# Patient Record
Sex: MALE | URBAN - METROPOLITAN AREA
[De-identification: names, ages, dates, MRNs, and addresses within clinical notes are randomized per-mention and may not be internally consistent; named-entity substitution may affect disease eponyms.]

---

## 2017-06-24 ENCOUNTER — INPATIENT HOSPITAL (OUTPATIENT)
Dept: URBAN - METROPOLITAN AREA CLINIC 129 | Facility: CLINIC | Age: 37
Setting detail: OPHTHALMOLOGY
End: 2017-06-24
Payer: COMMERCIAL

## 2017-06-24 DIAGNOSIS — H46.8: ICD-10-CM

## 2017-06-24 PROCEDURE — 99223 1ST HOSP IP/OBS HIGH 75: CPT | Performed by: OPHTHALMOLOGY

## 2021-01-05 ENCOUNTER — OFFICE VISIT (OUTPATIENT)
Dept: FAMILY MEDICINE CLINIC | Facility: CLINIC | Age: 41
End: 2021-01-05
Payer: COMMERCIAL

## 2021-01-05 VITALS
BODY MASS INDEX: 24.4 KG/M2 | DIASTOLIC BLOOD PRESSURE: 88 MMHG | TEMPERATURE: 97.2 F | SYSTOLIC BLOOD PRESSURE: 140 MMHG | HEIGHT: 68 IN | WEIGHT: 161 LBS

## 2021-01-05 DIAGNOSIS — M48.02 CERVICAL STENOSIS OF SPINE: ICD-10-CM

## 2021-01-05 DIAGNOSIS — E55.9 VITAMIN D DEFICIENCY: ICD-10-CM

## 2021-01-05 DIAGNOSIS — G35 MULTIPLE SCLEROSIS (HCC): Primary | ICD-10-CM

## 2021-01-05 DIAGNOSIS — I10 HYPERTENSION, BENIGN ESSENTIAL, GOAL BELOW 140/90: ICD-10-CM

## 2021-01-05 DIAGNOSIS — Z72.0 TOBACCO ABUSE: ICD-10-CM

## 2021-01-05 DIAGNOSIS — K21.9 GASTROESOPHAGEAL REFLUX DISEASE WITHOUT ESOPHAGITIS: ICD-10-CM

## 2021-01-05 DIAGNOSIS — Z22.7 LTBI (LATENT TUBERCULOSIS INFECTION): ICD-10-CM

## 2021-01-05 DIAGNOSIS — H53.8 BLURRED VISION, RIGHT EYE: ICD-10-CM

## 2021-01-05 DIAGNOSIS — F32.1 CURRENT MODERATE EPISODE OF MAJOR DEPRESSIVE DISORDER WITHOUT PRIOR EPISODE (HCC): ICD-10-CM

## 2021-01-05 PROBLEM — G57.51 TARSAL TUNNEL SYNDROME OF RIGHT SIDE: Status: ACTIVE | Noted: 2020-09-13

## 2021-01-05 PROBLEM — R74.01 TRANSAMINITIS: Status: ACTIVE | Noted: 2020-12-01

## 2021-01-05 PROBLEM — G89.29 CHRONIC LOW BACK PAIN: Status: ACTIVE | Noted: 2020-09-14

## 2021-01-05 PROBLEM — M54.50 CHRONIC LOW BACK PAIN: Status: ACTIVE | Noted: 2020-09-14

## 2021-01-05 PROBLEM — R74.01 TRANSAMINITIS: Status: RESOLVED | Noted: 2020-12-01 | Resolved: 2021-01-05

## 2021-01-05 PROCEDURE — 3725F SCREEN DEPRESSION PERFORMED: CPT | Performed by: FAMILY MEDICINE

## 2021-01-05 PROCEDURE — 3079F DIAST BP 80-89 MM HG: CPT | Performed by: FAMILY MEDICINE

## 2021-01-05 PROCEDURE — 99204 OFFICE O/P NEW MOD 45 MIN: CPT | Performed by: FAMILY MEDICINE

## 2021-01-05 PROCEDURE — 4004F PT TOBACCO SCREEN RCVD TLK: CPT | Performed by: FAMILY MEDICINE

## 2021-01-05 PROCEDURE — 3008F BODY MASS INDEX DOCD: CPT | Performed by: FAMILY MEDICINE

## 2021-01-05 PROCEDURE — 3077F SYST BP >= 140 MM HG: CPT | Performed by: FAMILY MEDICINE

## 2021-01-05 RX ORDER — IBUPROFEN 200 MG
1 CAPSULE ORAL DAILY
COMMUNITY
Start: 2020-08-17

## 2021-01-05 RX ORDER — ISONIAZID 300 MG/1
900 TABLET ORAL WEEKLY
COMMUNITY
Start: 2020-12-01 | End: 2021-04-05

## 2021-01-05 RX ORDER — FERROUS SULFATE 325(65) MG
324 TABLET ORAL
COMMUNITY
Start: 2020-08-17

## 2021-01-05 RX ORDER — DIPHENHYDRAMINE HYDROCHLORIDE 25 MG/1
25 CAPSULE ORAL DAILY
COMMUNITY
Start: 2020-12-01 | End: 2021-01-05

## 2021-01-05 RX ORDER — GLATIRAMER 40 MG/ML
INJECTION, SOLUTION SUBCUTANEOUS
COMMUNITY
Start: 2020-08-14 | End: 2021-07-08

## 2021-01-05 RX ORDER — ESCITALOPRAM OXALATE 10 MG/1
10 TABLET ORAL
COMMUNITY
Start: 2020-08-17 | End: 2021-01-06 | Stop reason: SDUPTHER

## 2021-01-05 RX ORDER — DIPHENHYDRAMINE HYDROCHLORIDE 25 MG/1
25 CAPSULE ORAL DAILY
COMMUNITY
Start: 2020-12-01

## 2021-01-05 RX ORDER — PANTOPRAZOLE SODIUM 40 MG/1
40 TABLET, DELAYED RELEASE ORAL DAILY
COMMUNITY
Start: 2020-12-16 | End: 2021-01-19 | Stop reason: SDUPTHER

## 2021-01-05 RX ORDER — GABAPENTIN 300 MG/1
300 CAPSULE ORAL 3 TIMES DAILY
COMMUNITY
Start: 2020-12-01 | End: 2021-04-05

## 2021-01-05 RX ORDER — CALCIUM POLYCARBOPHIL 625 MG 625 MG/1
625 TABLET ORAL
COMMUNITY

## 2021-01-05 RX ORDER — AMLODIPINE BESYLATE 5 MG/1
5 TABLET ORAL DAILY
COMMUNITY
Start: 2020-12-09 | End: 2021-01-06 | Stop reason: SDUPTHER

## 2021-01-05 NOTE — PROGRESS NOTES
Assessment/Plan:    Blurred vision, right eye  followup with eye doctor and neurology    Cervical stenosis of spine  Keep appointment for steroid injection continue neurontin    Current moderate episode of major depressive disorder without prior episode [facility])  Patient is now feeling much better Divorce is settled Patient has moved and has a new job Stable on meds conitnue current meds and followup in 6 months    GERD (gastroesophageal reflux disease)  Continue with protonix    Hypertension, benign essential, goal below 140/90  Blood pressure stable on meds conitnue meds and followup in 6 motnhs    LTBI (latent tuberculosis infection)  conitnue meds and followup with infectious disease    [facility] ([facility]  Reviewed MRI from the hospital Patient will continue to see the neurologist as scheduled and continue current med    Tobacco abuse  Encouraged stopping smoking Patient to discuss with the neurologist if chantix is an option If so and patient is ready I will start that     Vitamin D deficiency  Continue iwht vitamin D Rpeat labs at next visit in 6 months       Diagnoses and all orders for this visit:    Multiple sclerosis ([facility] )  -     Ambulatory referral to Ophthalmology; Future    Blurred vision, right eye  -     Ambulatory referral to Ophthalmology; Future    Hypertension, benign essential, goal below 140/90    Current moderate episode of major depressive disorder without prior episode (HCC)    Cervical stenosis of spine    LTBI (latent tuberculosis infection)    Tobacco abuse    Gastroesophageal reflux disease without esophagitis    Vitamin D deficiency    Other orders  -     amLODIPine (NORVASC) 5 mg tablet; Take 5 mg by mouth daily  -     escitalopram (LEXAPRO) 10 mg tablet; Take 10 mg by mouth  -     gabapentin (NEURONTIN) 300 mg capsule; Take 300 mg by mouth Three times a day  -     pantoprazole (PROTONIX) 40 mg tablet; Take 40 mg by mouth daily  -     Pyridoxine HCl (vitamin B-6) 25 MG tablet;  Take 25 mg by mouth daily  -     Glatiramer Acetate 40 MG/ML SOSY; Inject 1 mL (40 mg total) under the skin 3 (three) times a week (M,W, F) at 1800   -     isoniazid (NYDRAZID) 300 mg tablet; Take 900 mg by mouth once a week  -     Discontinue: Pyridoxine HCl (vitamin B-6) 25 MG tablet; Take 25 mg by mouth daily  -     calcium polycarbophil (FiberCon) 625 mg tablet; Take 625 mg by mouth  -     MULTIPLE VITAMIN PO; Take 1 capsule by mouth  -     ferrous sulfate 325 (65 Fe) mg tablet; Take 324 mg by mouth  -     calcium carbonate (OS-ZHENG) 1250 (500 Ca) MG tablet; Take 1 tablet by mouth daily  -     Cholecalciferol 25 MCG (1000 UT) capsule; Take 1,000 Units by mouth daily  -     cyanocobalamin (VITAMIN B-12) 100 MCG tablet; Take 100 mcg by mouth          Subjective:   Chief Complaint   Patient presents with    Follow-up    Hypertension    Establish Care          Patient ID: Norris Ferraro is a 38 y o  male      Patient is here to establish care and check up of hypertension GERD depression multiple sclerosis history of anemia and latent TB infection Patient was diagnosed with MS in 2017 patient is seen by [facility] neurology Patient is on copaxone Patient is hoping to change to an infusion However during that process his quanteferon gold test was positive He was referred for treatment Unfortunately he had had hemorrhoid surgery prior That surgery plus meds caused a lot of stomach issues and led to lower GI bleed and anemia Patient had a hospitalization end of November due to neck pain and blurry vision right eye and right arm pain  It was never determined if the right eye was trule optic neuritis , due to MS exacerbation or an issue with borderline glaucoma Patient has appt  2/8/2021 with eye doctor Patient has his neurlogy appt coming up shortly Patient eye issues are unchanged It still feels blurry on right side and he has "pressure sensation behind the eye" Patient has a call into neurology about this Patient is tolerating the TB treatment at this time and has appt [facility] infectious disease  Patient BP is controlled He feels the depression is stable on lexapro  Patient had lipid panel done in 9/2020 that was favorable He has moved here from [facility] and is starting a new job He has several herniated discs in the neck and is to have injections Patient is tolerating all medication He is smoker He is allergic to nicoderm products He gets stomach upset and profound sweating       The following portions of the patient's history were reviewed and updated as appropriate: allergies, current medications, past family history, past medical history, past social history, past surgical history and problem list     Review of Systems   Constitutional: Positive for fatigue  Negative for fever and unexpected weight change  HENT: Negative for congestion, sinus pain and trouble swallowing  Eyes: Positive for pain and visual disturbance  Negative for discharge  Respiratory: Negative for cough, chest tightness, shortness of breath and wheezing  Cardiovascular: Negative for chest pain, palpitations and leg swelling  Gastrointestinal: Negative for abdominal pain, blood in stool, constipation, diarrhea, nausea and vomiting  Genitourinary: Negative for difficulty urinating, dysuria, frequency and hematuria  Musculoskeletal: Negative for arthralgias, gait problem and joint swelling  Skin: Negative for rash and wound  Allergic/Immunologic: Negative for environmental allergies and food allergies  Neurological: Negative for dizziness, syncope, weakness, numbness and headaches  Hematological: Negative for adenopathy  Does not bruise/bleed easily  Psychiatric/Behavioral: Negative for confusion, decreased concentration and sleep disturbance  The patient is not nervous/anxious            Objective:      /88   Temp (!) 97 2 °F (36 2 °C)   Ht 5' 8" (1 727 m)   Wt 73 kg (161 lb)   BMI 24 48 kg/m²          Physical Exam  Vitals signs and nursing note reviewed  Constitutional:       Appearance: He is well-developed  HENT:      Head: Normocephalic and atraumatic  Right Ear: Hearing, tympanic membrane and external ear normal       Left Ear: Hearing, tympanic membrane and external ear normal    Eyes:      Conjunctiva/sclera: Conjunctivae normal       Pupils: Pupils are equal, round, and reactive to light  Neck:      Musculoskeletal: Neck supple  Thyroid: No thyromegaly  Cardiovascular:      Rate and Rhythm: Normal rate  Heart sounds: Normal heart sounds  Pulmonary:      Effort: Pulmonary effort is normal       Breath sounds: Normal breath sounds  No wheezing or rales  Abdominal:      General: Bowel sounds are normal  There is no distension  Palpations: Abdomen is soft  Tenderness: There is no abdominal tenderness  Musculoskeletal:         General: No tenderness  Lymphadenopathy:      Cervical: No cervical adenopathy  Skin:     General: Skin is warm and dry  Findings: No rash  Neurological:      Mental Status: He is alert and oriented to person, place, and time  Cranial Nerves: No cranial nerve deficit  Coordination: Coordination normal    Psychiatric:         Behavior: Behavior normal          Thought Content:  Thought content normal          Judgment: Judgment normal

## 2021-01-05 NOTE — ASSESSMENT & PLAN NOTE
Encouraged stopping smoking Patient to discuss with the neurologist if chantix is an option If so and patient is ready I will start that

## 2021-01-05 NOTE — PATIENT INSTRUCTIONS

## 2021-01-05 NOTE — ASSESSMENT & PLAN NOTE
Patient is now feeling much better Divorce is settled Patient has moved and has a new job Stable on meds alona current meds and followup in 6 months

## 2021-01-05 NOTE — ASSESSMENT & PLAN NOTE
Reviewed MRI from the hospital Patient will continue to see the neurologist as scheduled and continue current med

## 2021-01-06 DIAGNOSIS — I10 HYPERTENSION, BENIGN ESSENTIAL, GOAL BELOW 140/90: Primary | ICD-10-CM

## 2021-01-06 DIAGNOSIS — F32.1 CURRENT MODERATE EPISODE OF MAJOR DEPRESSIVE DISORDER WITHOUT PRIOR EPISODE (HCC): ICD-10-CM

## 2021-01-06 RX ORDER — ESCITALOPRAM OXALATE 10 MG/1
10 TABLET ORAL DAILY
Qty: 30 TABLET | Refills: 3 | Status: SHIPPED | OUTPATIENT
Start: 2021-01-06 | End: 2021-03-31 | Stop reason: SDUPTHER

## 2021-01-06 RX ORDER — AMLODIPINE BESYLATE 5 MG/1
5 TABLET ORAL DAILY
Qty: 30 TABLET | Refills: 3 | Status: SHIPPED | OUTPATIENT
Start: 2021-01-06 | End: 2021-01-19 | Stop reason: SDUPTHER

## 2021-01-11 DIAGNOSIS — G89.29 CHRONIC PAIN OF RIGHT ANKLE: Primary | ICD-10-CM

## 2021-01-11 DIAGNOSIS — M25.571 CHRONIC PAIN OF RIGHT ANKLE: Primary | ICD-10-CM

## 2021-01-11 DIAGNOSIS — G57.51 TARSAL TUNNEL SYNDROME OF RIGHT SIDE: ICD-10-CM

## 2021-01-13 ENCOUNTER — TRANSCRIBE ORDERS (OUTPATIENT)
Dept: ADMINISTRATIVE | Facility: HOSPITAL | Age: 41
End: 2021-01-13

## 2021-01-13 DIAGNOSIS — M86.671 OTHER CHRONIC OSTEOMYELITIS, RIGHT ANKLE AND FOOT (HCC): Primary | ICD-10-CM

## 2021-01-19 DIAGNOSIS — Z72.0 TOBACCO ABUSE: Primary | ICD-10-CM

## 2021-01-19 DIAGNOSIS — K21.9 GASTROESOPHAGEAL REFLUX DISEASE WITHOUT ESOPHAGITIS: Primary | ICD-10-CM

## 2021-01-19 DIAGNOSIS — I10 HYPERTENSION, BENIGN ESSENTIAL, GOAL BELOW 140/90: ICD-10-CM

## 2021-01-19 RX ORDER — AMLODIPINE BESYLATE 5 MG/1
5 TABLET ORAL DAILY
Qty: 30 TABLET | Refills: 0 | Status: SHIPPED | OUTPATIENT
Start: 2021-01-19 | End: 2021-03-31 | Stop reason: SDUPTHER

## 2021-01-19 RX ORDER — PANTOPRAZOLE SODIUM 40 MG/1
40 TABLET, DELAYED RELEASE ORAL DAILY
Qty: 30 TABLET | Refills: 3 | Status: SHIPPED | OUTPATIENT
Start: 2021-01-19 | End: 2021-03-31 | Stop reason: SDUPTHER

## 2021-01-19 RX ORDER — VARENICLINE TARTRATE 25 MG
KIT ORAL
Qty: 53 TABLET | Refills: 0 | Status: SHIPPED | OUTPATIENT
Start: 2021-01-19 | End: 2021-03-31 | Stop reason: SDUPTHER

## 2021-01-21 ENCOUNTER — HOSPITAL ENCOUNTER (OUTPATIENT)
Dept: MRI IMAGING | Facility: HOSPITAL | Age: 41
Discharge: HOME | End: 2021-01-21
Attending: PODIATRIST
Payer: COMMERCIAL

## 2021-01-21 DIAGNOSIS — M86.671 OTHER CHRONIC OSTEOMYELITIS, RIGHT ANKLE AND FOOT (HCC): ICD-10-CM

## 2021-01-21 PROCEDURE — 73721 MRI JNT OF LWR EXTRE W/O DYE: CPT

## 2021-01-21 PROCEDURE — G1004 CDSM NDSC: HCPCS

## 2021-02-08 DIAGNOSIS — N52.1 ERECTILE DISORDER DUE TO MEDICAL CONDITION IN MALE: Primary | ICD-10-CM

## 2021-02-08 RX ORDER — SILDENAFIL 50 MG/1
50 TABLET, FILM COATED ORAL DAILY PRN
Qty: 10 TABLET | Refills: 0 | Status: SHIPPED | OUTPATIENT
Start: 2021-02-08 | End: 2021-03-31 | Stop reason: SDUPTHER

## 2021-03-31 DIAGNOSIS — I10 HYPERTENSION, BENIGN ESSENTIAL, GOAL BELOW 140/90: ICD-10-CM

## 2021-03-31 DIAGNOSIS — K21.9 GASTROESOPHAGEAL REFLUX DISEASE WITHOUT ESOPHAGITIS: ICD-10-CM

## 2021-03-31 DIAGNOSIS — F32.1 CURRENT MODERATE EPISODE OF MAJOR DEPRESSIVE DISORDER WITHOUT PRIOR EPISODE (HCC): ICD-10-CM

## 2021-03-31 DIAGNOSIS — N52.1 ERECTILE DISORDER DUE TO MEDICAL CONDITION IN MALE: ICD-10-CM

## 2021-03-31 DIAGNOSIS — Z72.0 TOBACCO ABUSE: ICD-10-CM

## 2021-03-31 RX ORDER — SILDENAFIL 50 MG/1
50 TABLET, FILM COATED ORAL DAILY PRN
Qty: 10 TABLET | Refills: 0 | Status: SHIPPED | OUTPATIENT
Start: 2021-03-31 | End: 2021-05-19 | Stop reason: SDUPTHER

## 2021-03-31 RX ORDER — PANTOPRAZOLE SODIUM 40 MG/1
40 TABLET, DELAYED RELEASE ORAL DAILY
Qty: 30 TABLET | Refills: 3 | Status: SHIPPED | OUTPATIENT
Start: 2021-03-31 | End: 2021-07-29 | Stop reason: SDUPTHER

## 2021-03-31 RX ORDER — ESCITALOPRAM OXALATE 10 MG/1
10 TABLET ORAL DAILY
Qty: 30 TABLET | Refills: 3 | Status: SHIPPED | OUTPATIENT
Start: 2021-03-31 | End: 2021-03-31 | Stop reason: SDUPTHER

## 2021-03-31 RX ORDER — VARENICLINE TARTRATE 25 MG
KIT ORAL
Qty: 53 TABLET | Refills: 0 | Status: SHIPPED | OUTPATIENT
Start: 2021-03-31 | End: 2021-07-08

## 2021-03-31 RX ORDER — AMLODIPINE BESYLATE 5 MG/1
5 TABLET ORAL DAILY
Qty: 30 TABLET | Refills: 3 | Status: SHIPPED | OUTPATIENT
Start: 2021-03-31 | End: 2021-04-05

## 2021-04-01 RX ORDER — ESCITALOPRAM OXALATE 10 MG/1
10 TABLET ORAL DAILY
Qty: 30 TABLET | Refills: 0 | Status: SHIPPED | OUTPATIENT
Start: 2021-04-01 | End: 2021-05-19 | Stop reason: SDUPTHER

## 2021-04-05 ENCOUNTER — OFFICE VISIT (OUTPATIENT)
Dept: FAMILY MEDICINE CLINIC | Facility: CLINIC | Age: 41
End: 2021-04-05
Payer: COMMERCIAL

## 2021-04-05 VITALS
HEIGHT: 68 IN | DIASTOLIC BLOOD PRESSURE: 92 MMHG | WEIGHT: 161.8 LBS | TEMPERATURE: 97.2 F | BODY MASS INDEX: 24.52 KG/M2 | SYSTOLIC BLOOD PRESSURE: 160 MMHG

## 2021-04-05 DIAGNOSIS — M47.816 FACET ARTHRITIS OF LUMBAR REGION: ICD-10-CM

## 2021-04-05 DIAGNOSIS — G35 MULTIPLE SCLEROSIS (HCC): ICD-10-CM

## 2021-04-05 DIAGNOSIS — F32.1 CURRENT MODERATE EPISODE OF MAJOR DEPRESSIVE DISORDER WITHOUT PRIOR EPISODE (HCC): ICD-10-CM

## 2021-04-05 DIAGNOSIS — I10 HYPERTENSION, BENIGN ESSENTIAL, GOAL BELOW 140/90: ICD-10-CM

## 2021-04-05 DIAGNOSIS — L30.9 DERMATITIS: ICD-10-CM

## 2021-04-05 DIAGNOSIS — M54.16 RIGHT LUMBAR RADICULOPATHY: Primary | ICD-10-CM

## 2021-04-05 PROBLEM — Z22.7 LTBI (LATENT TUBERCULOSIS INFECTION): Status: RESOLVED | Noted: 2020-11-17 | Resolved: 2021-04-05

## 2021-04-05 PROCEDURE — 3077F SYST BP >= 140 MM HG: CPT | Performed by: FAMILY MEDICINE

## 2021-04-05 PROCEDURE — 4004F PT TOBACCO SCREEN RCVD TLK: CPT | Performed by: FAMILY MEDICINE

## 2021-04-05 PROCEDURE — 3008F BODY MASS INDEX DOCD: CPT | Performed by: FAMILY MEDICINE

## 2021-04-05 PROCEDURE — 3080F DIAST BP >= 90 MM HG: CPT | Performed by: FAMILY MEDICINE

## 2021-04-05 PROCEDURE — 99214 OFFICE O/P EST MOD 30 MIN: CPT | Performed by: FAMILY MEDICINE

## 2021-04-05 RX ORDER — METHOCARBAMOL 500 MG/1
500 TABLET, FILM COATED ORAL 4 TIMES DAILY PRN
COMMUNITY
Start: 2021-03-31 | End: 2021-07-08

## 2021-04-05 RX ORDER — GABAPENTIN 400 MG/1
400 CAPSULE ORAL 3 TIMES DAILY
COMMUNITY
Start: 2021-03-31 | End: 2021-04-30

## 2021-04-05 RX ORDER — AMLODIPINE BESYLATE 10 MG/1
10 TABLET ORAL DAILY
Qty: 30 TABLET | Refills: 5 | Status: SHIPPED | OUTPATIENT
Start: 2021-04-05 | End: 2021-05-05 | Stop reason: SDUPTHER

## 2021-04-05 RX ORDER — LIDOCAINE 50 MG/G
OINTMENT TOPICAL
COMMUNITY
Start: 2021-03-16 | End: 2022-05-31

## 2021-04-05 RX ORDER — DICLOFENAC SODIUM 16.05 MG/ML
SOLUTION TOPICAL
COMMUNITY
Start: 2021-03-16 | End: 2022-05-31

## 2021-04-05 RX ORDER — ACETAMINOPHEN 500 MG
500 TABLET ORAL EVERY 6 HOURS PRN
COMMUNITY
Start: 2021-03-31 | End: 2021-04-10

## 2021-04-05 NOTE — PROGRESS NOTES
Assessment/Plan:    Current moderate episode of major depressive disorder without prior episode ([facility] )  Controlled iwht meds    Dermatitis  Refer to dermatology    Facet arthritis of lumbar region  Must followup with pain management alona the increased dose of gabapentin    Hypertension, benign essential, goal below 140/90  BP is too high and was in hospital too Will increase amlodipine to 10 mg daily    Multiple sclerosis ([facility] )  Continue meds seen neuro    Right lumbar radiculopathy  Discussed the pain and the MRI Patient to alona meds as directed contact pain management regarind injection as directed        Diagnoses and all orders for this visit:    Right lumbar radiculopathy    Facet arthritis of lumbar region    Multiple sclerosis ([facility] )    Hypertension, benign essential, goal below 140/90  -     amLODIPine (NORVASC) 10 mg tablet; Take 1 tablet (10 mg total) by mouth daily    Current moderate episode of major depressive disorder without prior episode ([facility] )    Dermatitis  -     Ambulatory referral to Dermatology; Future    Other orders  -     multivitamin-minerals (CENTRUM) tablet; Take 1 capsule by mouth daily  -     acetaminophen (TYLENOL) 500 mg tablet; Take 500 mg by mouth every 6 (six) hours as needed  -     methocarbamol (ROBAXIN) 500 mg tablet; Take 500 mg by mouth 4 (four) times a day as needed  -     rifampin (RIFADIN) 300 mg capsule  -     lidocaine (XYLOCAINE) 5 % ointment; APPLY 2-3 GRAMS TO THE AFFECTED AREA OF THE FOOT 3-4 TIMES DAILY AS NEEDED FOR PAIN  -     Diclofenac Sodium 1 5 % SOLN; APPLY 40 DROPS TO THE AFFECTED AREAS 3-4 TIMES DAILY AS NEEDED FOR PAIN  -     gabapentin (NEURONTIN) 400 mg capsule; Take 400 mg by mouth Three times a day          Subjective:   Chief Complaint   Patient presents with    Follow-up     [facility]          Patient ID: Evelyn Mejia is a 38 y o  male      Patient is here for [facility] stay Patient was seen in ER for right leg pain and weakness Patient was admitted and had MRI done Patient was not sure if this was MS exacerbation or due to his back Patient had run out of his meeciation for 2 weeks prior Patient MRI showed bilateral foramenal stenosis at several lumbar areas Pateint was discjarged on increase in gabapentin to 400mg TID the muscle relaxant and also the tylenol His pain is still 4 out of 10 andincrease with walkin Patient has had elevated BP in last 1-2 weeks he is taking his medication as directed Patient [facility] paperwork and consults and MRI were reviewed with him      The following portions of the patient's history were reviewed and updated as appropriate: allergies, current medications, past family history, past medical history, past social history, past surgical history and problem list     Review of Systems   Constitutional: Negative for activity change and appetite change  Respiratory: Negative for cough and shortness of breath  Cardiovascular: Negative for chest pain, palpitations and leg swelling  Genitourinary: Negative for difficulty urinating and dysuria  No incontinence   Musculoskeletal: Positive for back pain  Skin: Negative for rash  Neurological: Negative for dizziness, light-headedness and headaches  Psychiatric/Behavioral: Negative for dysphoric mood and sleep disturbance  The patient is not nervous/anxious  Objective:      /92   Temp (!) 97 2 °F (36 2 °C)   Ht 5' 8" (1 727 m)   Wt 73 4 kg (161 lb 12 8 oz)   BMI 24 60 kg/m²          Physical Exam  Vitals signs reviewed  Constitutional:       Appearance: Normal appearance  HENT:      Head: Normocephalic  Right Ear: Tympanic membrane and external ear normal       Left Ear: Tympanic membrane and external ear normal    Cardiovascular:      Rate and Rhythm: Normal rate and regular rhythm  Pulses: Normal pulses  Heart sounds: Normal heart sounds  Skin:     Findings: Rash present        Comments: Open comedones on the chest and upper back for last 2 months    Neurological:      General: No focal deficit present  Mental Status: He is alert and oriented to person, place, and time  Mental status is at baseline

## 2021-04-05 NOTE — ASSESSMENT & PLAN NOTE
[facility] the pain [facility] contact pain management regarind injection as directed

## 2021-04-05 NOTE — PATIENT INSTRUCTIONS

## 2021-05-05 DIAGNOSIS — I10 HYPERTENSION, BENIGN ESSENTIAL, GOAL BELOW 140/90: ICD-10-CM

## 2021-05-05 RX ORDER — AMLODIPINE BESYLATE 10 MG/1
10 TABLET ORAL DAILY
Qty: 90 TABLET | Refills: 1 | Status: SHIPPED | OUTPATIENT
Start: 2021-05-05 | End: 2021-05-19 | Stop reason: SDUPTHER

## 2021-05-19 DIAGNOSIS — N52.1 ERECTILE DISORDER DUE TO MEDICAL CONDITION IN MALE: ICD-10-CM

## 2021-05-19 DIAGNOSIS — F32.1 CURRENT MODERATE EPISODE OF MAJOR DEPRESSIVE DISORDER WITHOUT PRIOR EPISODE (HCC): ICD-10-CM

## 2021-05-19 DIAGNOSIS — I10 HYPERTENSION, BENIGN ESSENTIAL, GOAL BELOW 140/90: ICD-10-CM

## 2021-05-20 RX ORDER — SILDENAFIL 50 MG/1
50 TABLET, FILM COATED ORAL DAILY PRN
Qty: 10 TABLET | Refills: 0 | Status: SHIPPED | OUTPATIENT
Start: 2021-05-20 | End: 2021-06-12 | Stop reason: SDUPTHER

## 2021-05-20 RX ORDER — ESCITALOPRAM OXALATE 10 MG/1
10 TABLET ORAL DAILY
Qty: 30 TABLET | Refills: 0 | Status: SHIPPED | OUTPATIENT
Start: 2021-05-20 | End: 2021-07-29 | Stop reason: SDUPTHER

## 2021-05-20 RX ORDER — AMLODIPINE BESYLATE 10 MG/1
10 TABLET ORAL DAILY
Qty: 90 TABLET | Refills: 0 | Status: SHIPPED | OUTPATIENT
Start: 2021-05-20 | End: 2021-07-08 | Stop reason: SDUPTHER

## 2021-06-12 DIAGNOSIS — N52.1 ERECTILE DISORDER DUE TO MEDICAL CONDITION IN MALE: ICD-10-CM

## 2021-06-14 RX ORDER — SILDENAFIL 50 MG/1
50 TABLET, FILM COATED ORAL DAILY PRN
Qty: 10 TABLET | Refills: 2 | Status: SHIPPED | OUTPATIENT
Start: 2021-06-14 | End: 2021-07-19 | Stop reason: SDUPTHER

## 2021-07-06 ENCOUNTER — APPOINTMENT (OUTPATIENT)
Dept: RADIOLOGY | Facility: AMBULARY SURGERY CENTER | Age: 41
End: 2021-07-06
Attending: ORTHOPAEDIC SURGERY
Payer: COMMERCIAL

## 2021-07-06 VITALS
SYSTOLIC BLOOD PRESSURE: 133 MMHG | HEIGHT: 68 IN | RESPIRATION RATE: 17 BRPM | DIASTOLIC BLOOD PRESSURE: 87 MMHG | BODY MASS INDEX: 25.94 KG/M2 | HEART RATE: 89 BPM | WEIGHT: 171.2 LBS

## 2021-07-06 DIAGNOSIS — M25.571 PAIN, JOINT, ANKLE AND FOOT, RIGHT: ICD-10-CM

## 2021-07-06 DIAGNOSIS — M19.071 LOCALIZED OSTEOARTHRITIS OF ANKLE, RIGHT: ICD-10-CM

## 2021-07-06 DIAGNOSIS — M25.571 PAIN, JOINT, ANKLE AND FOOT, RIGHT: Primary | ICD-10-CM

## 2021-07-06 PROCEDURE — 99243 OFF/OP CNSLTJ NEW/EST LOW 30: CPT | Performed by: ORTHOPAEDIC SURGERY

## 2021-07-06 PROCEDURE — 73600 X-RAY EXAM OF ANKLE: CPT

## 2021-07-06 PROCEDURE — 73630 X-RAY EXAM OF FOOT: CPT

## 2021-07-06 PROCEDURE — 73610 X-RAY EXAM OF ANKLE: CPT

## 2021-07-06 RX ORDER — DIMETHYL FUMARATE 240 MG/1
240 CAPSULE ORAL 2 TIMES DAILY
COMMUNITY
Start: 2021-06-02 | End: 2022-05-31

## 2021-07-06 RX ORDER — GABAPENTIN 400 MG/1
400 CAPSULE ORAL EVERY 8 HOURS
COMMUNITY
Start: 2021-06-11 | End: 2022-05-31

## 2021-07-06 RX ORDER — MELOXICAM 7.5 MG/1
1 TABLET ORAL AS NEEDED
COMMUNITY
Start: 2021-06-25 | End: 2021-07-08

## 2021-07-06 RX ORDER — OXYCODONE HYDROCHLORIDE 5 MG/1
1 TABLET ORAL AS NEEDED
COMMUNITY
Start: 2021-06-25 | End: 2022-05-31

## 2021-07-06 RX ORDER — LOSARTAN POTASSIUM 25 MG/1
25 TABLET ORAL DAILY
COMMUNITY
Start: 2021-05-28 | End: 2021-07-08 | Stop reason: SDUPTHER

## 2021-07-06 NOTE — LETTER
July 6, 2021     Delfin Amanuel, [address] [facility] [place] [address]  [address]    Patient: Elvia Yi   YOB: 1980   Date of Visit: 7/6/2021       Dear Dr Santhosh Au:    Thank you for referring Elvia iY to me for evaluation  Below are my notes for this consultation  If you have questions, please do not hesitate to call me  I look forward to following your patient along with you  Sincerely,        Marshal Donnelly MD        CC: No Recipients  Marshal Donnelly MD  7/6/2021  8:54 AM  Signed         LIZABETH Ortiz  Attending, Orthopaedic Surgery  Foot and [facility] [address] [facility]        ORTHOPAEDIC FOOT AND ANKLE CLINIC VISIT     Assessment:     Encounter Diagnoses   Name Primary?  Pain, joint, ankle and foot, right Yes    Localized osteoarthritis of ankle, right               Plan:   · The patient verbalized understanding of exam findings and treatment plan  We engaged in the shared decision-making process and treatment options were discussed at length with the patient  Surgical and conservative management discussed today along with risks and benefits  · CT right ankle ordered for surgical planning  He has a hindfoot valgus surgery with forefoot abduction  His Talus appears to be fused in plantarflexion  · Briefly discussed surgery with the patient today which would likely be a closing wedge osteotomy through the ankle joint with subtalar fusion and possible forefoot correction with cotton osteotomy  · We will discuss surgery in greater detail at the next visit  Return for discussion of diagnostic studies  History of Present Illness:   Chief Complaint:   Chief Complaint   Patient presents with    Right Ankle - Pain     Elvia Yi is a 38 y o  male who is being seen for right ankle pain  Patient had a right ankle fusion at the age of 2 due to osteomyelitis    Pain is localized at globally with minimal radiating and described as sharp and severe  Patient denies numbness, tingling or radicular pain  Denies history of neuropathy  Patient does  smoke, does  have diabetes and does  take blood thinners  Patient does have a history of MS Patient denies family history of anesthesia complications and has not had any complications with anesthesia  Patient was referred by Dr Falguni Rivera at [facility]  He as still been using the [address] without benefit  Pain/symptom timing:  Worse during the day when active  Pain/symptom context:  Worse with activites and work  Pain/symptom modifying factors:  Rest makes better, activities make worse  Pain/symptom associated signs/symptoms: none    Prior treatment   · NSAIDsYes    · Injections Yes   · Bracing/Orthotics Yes   · Physical Therapy Yes     Orthopedic Surgical History:   Right ankle fusion age 1    Past Medical, Surgical and Social History:  Past Medical History:  has a past medical history of Cervical stenosis of spine, Depression, GERD (gastroesophageal reflux disease), Hypertension, Latent tuberculosis infection, and Multiple sclerosis ([facility] )  Problem List: does not have any pertinent problems on file  Past Surgical History:  has a past surgical history that includes Hemorroidectomy; Tympanic membrane repair; and Ankle osteotomy  Family History: family history includes Alcohol abuse in his sister; Alzheimer's disease in his maternal grandfather; Anemia in his sister; Hypertension in his mother; No Known Problems in his brother, father, maternal grandmother, paternal grandfather, and paternal grandmother; Pernicious anemia in his sister  Social History:  reports that he has been smoking  He has been smoking about 1 00 pack per day  He has never used smokeless tobacco  He reports current alcohol use  He reports that he does not use drugs    Current Medications: has a current medication list which includes the following prescription(s): amlodipine, calcium carbonate, calcium polycarbophil, cholecalciferol, cyanocobalamin, diclofenac sodium, dimethyl fumarate, escitalopram, ferrous sulfate, gabapentin, lidocaine, losartan, meloxicam, methocarbamol, multiple vitamin, multivitamin-minerals, oxycodone, pantoprazole, vitamin b-6, rifampin, sildenafil, varenicline, and glatiramer acetate  Allergies: is allergic to nicoderm [nicotine]  Review of Systems:  General- denies fever/chills  HEENT- denies hearing loss or sore throat  Eyes- denies eye pain or visual disturbances, denies red eyes  Respiratory- denies cough or SOB  Cardio- denies chest pain or palpitations  GI- denies abdominal pain  Endocrine- denies urinary frequency  Urinary- denies pain with urination  Musculoskeletal- Negative except noted above  Skin- denies rashes or wounds  Neurological- denies dizziness or headache  Psychiatric- denies anxiety or difficulty concentrating    Physical Exam:   /87 (BP Location: Left arm, Patient Position: Sitting)   Pulse 89   Resp 17   Ht 5' 8" (1 727 m)   Wt 77 7 kg (171 lb 3 2 oz)   BMI 26 03 kg/m²   General/Constitutional: No apparent distress: well-nourished and well developed  Eyes: normal ocular motion  Cardio: RRR, Normal S1S2, No m/r/g  Lymphatic: No appreciable lymphadenopathy  Respiratory: Non-labored breathing, CTA b/l no w/c/r  Vascular: No edema, swelling or tenderness, except as noted in detailed exam   Integumentary: No impressive skin lesions present, except as noted in detailed exam   Neuro: No ataxia or tremors noted  Psych: Normal mood and affect, oriented to person, place and time  Appropriate affect  Musculoskeletal: Normal, except as noted in detailed exam and in HPI      Examination    Right    Gait Antalgic, pes planus   Musculoskeletal Tender to palpation at subtalar     Skin Normal       Nails Normal    Range of Motion  0 degrees dorsiflexion, 0 degrees plantarflexion  Subtalar motion: decreased    Stability Stable    Muscle Strength 5/5 tibialis anterior  5/5 gastrocnemius-soleus  5/5 posterior tibialis  5/5 peroneal/eversion strength  5/5 EHL  5/5 FHL    Neurologic Normal    Sensation  Intact to light touch throughout sural, saphenous, superficial peroneal, deep peroneal and medial/lateral plantar nerve distributions  [place] 5 07 filament (10g) testing  deferred  Cardiovascular Brisk capillary refill < 2 seconds,intact DP and PT pulses    Special Tests None      Imaging Studies:   2 views of the right ankle and 3 view foot were taken, reviewed and interpreted independently that demonstrates no fracture or dislocation fused ankle joint and osteoarthritis subtalar and talonavicular joint    Reviewed by me personally  MRI right ankle demonstrates solid fusion across the tibiotalar and distal tibiofibular joint      Rutha Lineman Lachman, MD  Foot & Ankle Surgery   Department [place] [address]      I personally performed the service  Rutha Lineman Lachman, MD          Scribe Attestation    I,:  Soo Neumann am acting as a scribe while in the presence of the attending physician :       I,:  Luis Schmidt MD personally performed the services described in this documentation    as scribed in my presence :

## 2021-07-06 NOTE — PROGRESS NOTES
LIZABETH Xie  Attending, Orthopaedic Surgery  Foot and [address] [facility] [address] [facility]        ORTHOPAEDIC FOOT AND ANKLE CLINIC VISIT     Assessment:     Encounter Diagnoses   Name Primary?  Pain, joint, ankle and foot, right Yes    Localized osteoarthritis of ankle, right               Plan:   · The patient verbalized understanding of exam findings and treatment plan  We engaged in the shared decision-making process and treatment options were discussed at length with the patient  Surgical and conservative management discussed today along with risks and benefits  · CT right ankle ordered for surgical planning  He has a hindfoot valgus surgery with forefoot abduction  His Talus appears to be fused in plantarflexion  · Briefly discussed surgery with the patient today which would likely be a closing wedge osteotomy through the ankle joint with subtalar fusion and possible forefoot correction with cotton osteotomy  · We will discuss surgery in greater detail at the next visit  Return for discussion of diagnostic studies  History of Present Illness:   Chief Complaint:   Chief Complaint   Patient presents with    Right Ankle - Pain     Laura Brandon is a 38 y o  male who is being seen for right ankle pain  Patient had a right ankle fusion at the age of 2 due to osteomyelitis  Pain is localized at globally with minimal radiating and described as sharp and severe  Patient denies numbness, tingling or radicular pain  Denies history of neuropathy  Patient does  smoke, does  have diabetes and does  take blood thinners  Patient does have a history of MS Patient denies family history of anesthesia complications and has not had any complications with anesthesia  Patient was referred by Dr Vic Manley at [facility]  He as still been using the [facility] without benefit        Pain/symptom timing:  Worse during the day when active  Pain/symptom context:  Worse with activites and work  Pain/symptom modifying factors:  Rest makes better, activities make worse  Pain/symptom associated signs/symptoms: none    Prior treatment   · NSAIDsYes    · Injections Yes   · Bracing/Orthotics Yes   · Physical Therapy Yes     Orthopedic Surgical History:   Right ankle fusion age 1    Past Medical, Surgical and Social History:  Past Medical History:  has a past medical history of Cervical stenosis of spine, Depression, GERD (gastroesophageal reflux disease), Hypertension, Latent tuberculosis infection, and Multiple sclerosis ([facility] [address] )  Problem List: does not have any pertinent problems on file  Past Surgical History:  has a past surgical history that includes Hemorroidectomy; Tympanic membrane repair; and Ankle osteotomy  Family History: family history includes Alcohol abuse in his sister; Alzheimer's disease in his maternal grandfather; Anemia in his sister; Hypertension in his mother; No Known Problems in his brother, father, maternal grandmother, paternal grandfather, and paternal grandmother; Pernicious anemia in his sister  Social History:  reports that he has been smoking  He has been smoking about 1 00 pack per day  He has never used smokeless tobacco  He reports current alcohol use  He reports that he does not use drugs  Current Medications: has a current medication list which includes the following prescription(s): amlodipine, calcium carbonate, calcium polycarbophil, cholecalciferol, cyanocobalamin, diclofenac sodium, dimethyl fumarate, escitalopram, ferrous sulfate, gabapentin, lidocaine, losartan, meloxicam, methocarbamol, multiple vitamin, multivitamin-minerals, oxycodone, pantoprazole, vitamin b-6, rifampin, sildenafil, varenicline, and glatiramer acetate  Allergies: is allergic to nicoderm [nicotine]       Review of Systems:  General- denies fever/chills  HEENT- denies hearing loss or sore throat  Eyes- denies eye pain or visual disturbances, denies red eyes  Respiratory- denies cough or SOB  Cardio- denies chest pain or palpitations  GI- denies abdominal pain  Endocrine- denies urinary frequency  Urinary- denies pain with urination  Musculoskeletal- Negative except noted above  Skin- denies rashes or wounds  Neurological- denies dizziness or headache  Psychiatric- denies anxiety or difficulty concentrating    Physical Exam:   /87 (BP Location: Left arm, Patient Position: Sitting)   Pulse 89   Resp 17   Ht 5' 8" (1 727 m)   Wt 77 7 kg (171 lb 3 2 oz)   BMI 26 03 kg/m²   General/Constitutional: No apparent distress: well-nourished and well developed  Eyes: normal ocular motion  Cardio: RRR, Normal S1S2, No m/r/g  Lymphatic: No appreciable lymphadenopathy  Respiratory: Non-labored breathing, CTA b/l no w/c/r  Vascular: No edema, swelling or tenderness, except as noted in detailed exam   Integumentary: No impressive skin lesions present, except as noted in detailed exam   Neuro: No ataxia or tremors noted  Psych: Normal mood and affect, oriented to person, place and time  Appropriate affect  Musculoskeletal: Normal, except as noted in detailed exam and in HPI  Examination    Right    Gait Antalgic, pes planus   Musculoskeletal Tender to palpation at subtalar     Skin Normal       Nails Normal    Range of Motion  0 degrees dorsiflexion, 0 degrees plantarflexion  Subtalar motion: decreased    Stability Stable    Muscle Strength 5/5 tibialis anterior  5/5 gastrocnemius-soleus  5/5 posterior tibialis  5/5 peroneal/eversion strength  5/5 EHL  5/5 FHL    Neurologic Normal    Sensation  Intact to light touch throughout sural, saphenous, superficial peroneal, deep peroneal and medial/lateral plantar nerve distributions  [place]-Eric 5 07 filament (10g) testing  deferred      Cardiovascular Brisk capillary refill < 2 seconds,intact DP and PT pulses    Special Tests None      Imaging Studies:   2 views of the right ankle and 3 view foot were taken, reviewed and interpreted independently that demonstrates no fracture or dislocation fused ankle joint and osteoarthritis subtalar and talonavicular joint    Reviewed by me personally  MRI right ankle demonstrates solid fusion across the tibiotalar and distal tibiofibular joint      Halbert Blalock Lachman, MD  Foot & Ankle Surgery   Department [place] [address]      I personally performed the service  Halbert Blalock Lachman, MD          Scribe Attestation    I,:  Alona Teran am acting as a scribe while in the presence of the attending physician :       I,:  Linda Garcia MD personally performed the services described in this documentation    as scribed in my presence :

## 2021-07-08 ENCOUNTER — OFFICE VISIT (OUTPATIENT)
Dept: FAMILY MEDICINE CLINIC | Facility: CLINIC | Age: 41
End: 2021-07-08
Payer: COMMERCIAL

## 2021-07-08 VITALS
DIASTOLIC BLOOD PRESSURE: 80 MMHG | HEIGHT: 68 IN | BODY MASS INDEX: 27.07 KG/M2 | TEMPERATURE: 98 F | WEIGHT: 178.6 LBS | SYSTOLIC BLOOD PRESSURE: 148 MMHG

## 2021-07-08 DIAGNOSIS — M19.071 LOCALIZED OSTEOARTHRITIS OF RIGHT ANKLE: ICD-10-CM

## 2021-07-08 DIAGNOSIS — Z72.0 TOBACCO ABUSE: ICD-10-CM

## 2021-07-08 DIAGNOSIS — F32.1 CURRENT MODERATE EPISODE OF MAJOR DEPRESSIVE DISORDER WITHOUT PRIOR EPISODE (HCC): ICD-10-CM

## 2021-07-08 DIAGNOSIS — E55.9 VITAMIN D DEFICIENCY: ICD-10-CM

## 2021-07-08 DIAGNOSIS — E66.3 OVERWEIGHT: ICD-10-CM

## 2021-07-08 DIAGNOSIS — R53.1 RIGHT SIDED WEAKNESS: ICD-10-CM

## 2021-07-08 DIAGNOSIS — M54.16 RIGHT LUMBAR RADICULOPATHY: ICD-10-CM

## 2021-07-08 DIAGNOSIS — M47.816 FACET ARTHRITIS OF LUMBAR REGION: ICD-10-CM

## 2021-07-08 DIAGNOSIS — I10 HYPERTENSION, BENIGN ESSENTIAL, GOAL BELOW 140/90: Primary | ICD-10-CM

## 2021-07-08 DIAGNOSIS — G35 MULTIPLE SCLEROSIS (HCC): ICD-10-CM

## 2021-07-08 DIAGNOSIS — K21.9 GASTROESOPHAGEAL REFLUX DISEASE WITHOUT ESOPHAGITIS: ICD-10-CM

## 2021-07-08 PROBLEM — L30.9 DERMATITIS: Status: RESOLVED | Noted: 2021-04-05 | Resolved: 2021-07-08

## 2021-07-08 PROCEDURE — 3725F SCREEN DEPRESSION PERFORMED: CPT | Performed by: FAMILY MEDICINE

## 2021-07-08 PROCEDURE — 99214 OFFICE O/P EST MOD 30 MIN: CPT | Performed by: FAMILY MEDICINE

## 2021-07-08 RX ORDER — DOXYCYCLINE 100 MG/1
TABLET ORAL
COMMUNITY
Start: 2021-06-01 | End: 2022-05-31

## 2021-07-08 RX ORDER — TRIAMCINOLONE ACETONIDE 1 MG/G
CREAM TOPICAL
COMMUNITY
Start: 2021-07-01 | End: 2022-05-31

## 2021-07-08 RX ORDER — BENZOYL PEROXIDE 50 MG/ML
LIQUID TOPICAL
COMMUNITY
Start: 2021-07-03 | End: 2022-05-31

## 2021-07-08 RX ORDER — CLINDAMYCIN PHOSPHATE 10 UG/ML
LOTION TOPICAL
COMMUNITY
Start: 2021-07-01 | End: 2022-05-31

## 2021-07-08 RX ORDER — LOSARTAN POTASSIUM 25 MG/1
25 TABLET ORAL DAILY
Qty: 90 TABLET | Refills: 1 | Status: SHIPPED | OUTPATIENT
Start: 2021-07-08 | End: 2021-10-18 | Stop reason: SDUPTHER

## 2021-07-08 RX ORDER — METHOCARBAMOL 750 MG/1
TABLET, FILM COATED ORAL
COMMUNITY
Start: 2021-06-11

## 2021-07-08 RX ORDER — NAPROXEN 500 MG/1
500 TABLET ORAL 2 TIMES DAILY WITH MEALS
COMMUNITY
Start: 2021-05-13 | End: 2022-05-31

## 2021-07-08 RX ORDER — ONDANSETRON 4 MG/1
4 TABLET, ORALLY DISINTEGRATING ORAL EVERY 8 HOURS PRN
COMMUNITY
Start: 2021-06-25

## 2021-07-08 RX ORDER — CLOBETASOL PROPIONATE 0.5 MG/G
OINTMENT TOPICAL
COMMUNITY
Start: 2021-07-06 | End: 2022-05-31

## 2021-07-08 RX ORDER — AMLODIPINE BESYLATE 10 MG/1
10 TABLET ORAL DAILY
Qty: 90 TABLET | Refills: 1 | Status: SHIPPED | OUTPATIENT
Start: 2021-07-08 | End: 2021-08-24 | Stop reason: SDUPTHER

## 2021-07-08 RX ORDER — CETIRIZINE HYDROCHLORIDE 10 MG/1
TABLET ORAL
COMMUNITY
Start: 2021-07-06

## 2021-07-08 RX ORDER — HYDROXYZINE HYDROCHLORIDE 10 MG/1
TABLET, FILM COATED ORAL
COMMUNITY
Start: 2021-07-06 | End: 2022-05-31

## 2021-07-08 NOTE — PATIENT INSTRUCTIONS
Chronic Hypertension   AMBULATORY CARE:   Hypertension  is high blood pressure  Your blood pressure is the force of your blood moving against the walls of your arteries  Hypertension causes your blood pressure to get so high that your heart has to work much harder than normal  This can damage your heart  Even if you have hypertension for years, lifestyle changes, medicines, or both can help bring your blood pressure to normal   Call 911 for any of the following:   · You have chest pain  · You have any of the following signs of a heart attack:      ? Squeezing, pressure, or pain in your chest    ? You may  also have any of the following:     § Discomfort or pain in your back, neck, jaw, stomach, or arm    § Shortness of breath    § Nausea or vomiting    § Lightheadedness or a sudden cold sweat    · You become confused or have difficulty speaking  · You suddenly feel lightheaded or have trouble breathing  Seek care immediately if:   · You have a severe headache or vision loss  · You have weakness in an arm or leg  Contact your healthcare provider if:   · You feel faint, dizzy, confused, or drowsy  · You have been taking your blood pressure medicine but your pressure is higher than your provider says it should be  · You have questions or concerns about your condition or care  Treatment for chronic hypertension  may include medicine to lower your blood pressure and cholesterol levels  A low cholesterol level helps prevent heart disease and makes it easier to control your blood pressure  Heart disease can make your blood pressure harder to control  You may also need to make lifestyle changes  What you need to know about the stages of hypertension:       · Normal blood pressure is 119/79 or lower   Your healthcare provider may only check your blood pressure each year if it stays at a normal level  · Elevated blood pressure is 120/79 to 129/79   This is sometimes called prehypertension   Your healthcare provider may suggest lifestyle changes to help lower your blood pressure to a normal level  He or she may then check it again in 3 to 6 months  · Stage 1 hypertension is 130/80  to 139/89   Your provider may recommend lifestyle changes, medication, and checks every 3 to 6 months until your blood pressure is controlled  · Stage 2 hypertension is 140/90 or higher   Your provider will recommend lifestyle changes and have you take 2 kinds of hypertension medicines  You will also need to have your blood pressure checked monthly until it is controlled  Manage chronic hypertension:   · Check your blood pressure at home  Avoid smoking, caffeine, and exercise at least 30 minutes before checking your blood pressure  Sit and rest for 5 minutes before you take your blood pressure  Extend your arm and support it on a flat surface  Your arm should be at the same level as your heart  Follow the directions that came with your blood pressure monitor  Check your blood pressure 2 times, 1 minute apart, before you take your medicine in the morning  Also check your blood pressure before your evening meal  Keep a record of your readings and bring it to your follow-up visits  Ask your healthcare provider what your blood pressure should be  · Manage any other health conditions you have  Health conditions such as diabetes can increase your risk for hypertension  Follow your healthcare provider's instructions and take all your medicines as directed  Talk to your healthcare provider about any new health conditions you have recently developed  · Ask about all medicines  Certain medicines can increase your blood pressure  Examples include oral birth control pills, decongestants, herbal supplements, and NSAIDs, such as ibuprofen  Your healthcare provider can tell you which medicines are safe for you to take  This includes prescription and over-the-counter medicines      Lifestyle changes you can make to lower your blood pressure: Your provider may want you to make more lifestyle changes if you are having trouble controlling your blood pressure  This may feel difficult over time, especially if you think you are making good changes but your pressure is still high  It might help to focus on one new change at a time  For example, try to add 1 more day of exercise, or exercise for an extra 10 minutes on 2 days  Small changes can make a big difference  Your healthcare provider can also refer you to specialists such as a dietitian who can help you make small changes  · Limit sodium (salt) as directed  Too much sodium can affect your fluid balance  Check labels to find low-sodium or no-salt-added foods  Some low-sodium foods use potassium salts for flavor  Too much potassium can also cause health problems  Your healthcare provider will tell you how much sodium and potassium are safe for you to have in a day  He or she may recommend that you limit sodium to 2,300 mg a day  · Follow the meal plan recommended by your healthcare provider  A dietitian or your provider can give you more information on low-sodium plans or the DASH (Dietary Approaches to Stop Hypertension) eating plan  The DASH plan is low in sodium, unhealthy fats, and total fat  It is high in potassium, calcium, and fiber  · Exercise to maintain a healthy weight  Exercise at least 30 minutes per day, on most days of the week  This will help decrease your blood pressure  Ask your healthcare provider about the best exercise plan for you  · Decrease stress  This may help lower your blood pressure  Learn ways to relax, such as deep breathing or listening to music  · Limit alcohol as directed  Alcohol can increase your blood pressure  A drink of alcohol is 12 ounces of beer, 5 ounces of wine, or 1½ ounces of liquor  · Do not smoke    Nicotine and other chemicals in cigarettes and cigars can increase your blood pressure and also cause lung damage  Ask your healthcare provider for information if you currently smoke and need help to quit  E-cigarettes or smokeless tobacco still contain nicotine  Talk to your healthcare provider before you use these products  Follow up with your healthcare provider as directed: You will need to return to have your blood pressure checked and to have other lab tests done  Write down your questions so you remember to ask them during your visits  © Copyright [address] Information is for End User's use only and may not be sold, redistributed or otherwise used for commercial purposes  All illustrations and images included in CareNotes® are the copyrighted property of [employer] M [employer] Inc  or [employer]   The above information is an  only  It is not intended as medical advice for individual conditions or treatments  Talk to your doctor, nurse or pharmacist before following any medical regimen to see if it is safe and effective for you  Weight Management   AMBULATORY CARE:   Why it is important to manage your weight:  Being overweight increases your risk of health conditions such as heart disease, high blood pressure, type 2 diabetes, and certain types of cancer  It can also increase your risk for osteoarthritis, sleep apnea, and other respiratory problems  Aim for a slow, steady weight loss  Even a small amount of weight loss can lower your risk of health problems  How to lose weight safely:  A safe and healthy way to lose weight is to eat fewer calories and get regular exercise  · You can lose up about 1 pound a week by decreasing the number of calories you eat by 500 calories each day  You can decrease calories by eating smaller portion sizes or by cutting out high-calorie foods  Read labels to find out how many calories are in the foods you eat  · You can also burn calories with exercise such as walking, swimming, or biking   You will be more likely to keep weight off if you make these changes part of your lifestyle  Exercise at least 30 minutes per day on most days of the week  You can also fit in more physical activity by taking the stairs instead of the elevator or parking farther away from stores  Ask your healthcare provider about the best exercise plan for you  Healthy meal plan for weight management:  A healthy meal plan includes a variety of foods, contains fewer calories, and helps you stay healthy  A healthy meal plan includes the following:     · Eat whole-grain foods more often  A healthy meal plan should contain fiber  Fiber is the part of grains, fruits, and vegetables that is not broken down by your body  Whole-grain foods are healthy and provide extra fiber in your diet  Some examples of whole-grain foods are whole-wheat breads and pastas, oatmeal, brown rice, and bulgur  · Eat a variety of vegetables every day  Include dark, leafy greens such as spinach, kale, gloria greens, and mustard greens  Eat yellow and orange vegetables such as carrots, sweet potatoes, and winter squash  · Eat a variety of fruits every day  Choose fresh or canned fruit (canned in its own juice or light syrup) instead of juice  Fruit juice has very little or no fiber  · Eat low-fat dairy foods  Drink fat-free (skim) milk or 1% milk  Eat fat-free yogurt and low-fat cottage cheese  Try low-fat cheeses such as mozzarella and other reduced-fat cheeses  · Choose meat and other protein foods that are low in fat  Choose beans or other legumes such as split peas or lentils  Choose fish, skinless poultry (chicken or turkey), or lean cuts of red meat (beef or pork)  Before you cook meat or poultry, cut off any visible fat  · Use less fat and oil  Try baking foods instead of frying them  Add less fat, such as margarine, sour cream, regular salad dressing and mayonnaise to foods  Eat fewer high-fat foods   Some examples of high-fat foods include french fries, doughnuts, ice cream, and cakes     · Eat fewer sweets  Limit foods and drinks that are high in sugar  This includes candy, cookies, regular soda, and sweetened drinks  Ways to decrease calories:   · Eat smaller portions  ? Use a small plate with smaller servings  ? Do not eat second helpings  ? When you eat at a restaurant, ask for a box and place half of your meal in the box before you eat  ? Share an entrée with someone else  · Replace high-calorie snacks with healthy, low-calorie snacks  ? Choose fresh fruit, vegetables, fat-free rice cakes, or air-popped popcorn instead of potato chips, nuts, or chocolate  ? Choose water or calorie-free drinks instead of soda or sweetened drinks  · Do not shop for groceries when you are hungry  You may be more likely to make unhealthy food choices  Take a grocery list of healthy foods and shop after you have eaten  · Eat regular meals  Do not skip meals  Skipping meals can lead to overeating later in the day  This can make it harder for you to lose weight  Eat a healthy snack in place of a meal if you do not have time to eat a regular meal  Talk with a dietitian to help you create a meal plan and schedule that is right for you  Other things to consider as you try to lose weight:   · Be aware of situations that may give you the urge to overeat, such as eating while watching television  Find ways to avoid these situations  For example, read a book, go for a walk, or do crafts  · Meet with a weight loss support group or friends who are also trying to lose weight  This may help you stay motivated to continue working on your weight loss goals  © Copyright [address] Information is for End User's use only and may not be sold, redistributed or otherwise used for commercial purposes  All illustrations and images included in CareNotes® are the copyrighted property of A D A M [employer] Inc  or Briana I[employer]  The above information is an  only   It is not intended as medical advice for individual conditions or treatments  Talk to your doctor, nurse or pharmacist before following any medical regimen to see if it is safe and effective for you

## 2021-07-08 NOTE — PROGRESS NOTES
Assessment/Plan:    Current moderate episode of major depressive disorder without prior episode (HCC)  patietn feels stable conitnue lexapro 10 mg daily followup in 6 months    GERD (gastroesophageal reflux disease)  Stable on medication continue medications as scheduled Followup in 6 months    Hypertension, benign essential, goal below 140/90  BP is stable continue current meds check lipids and comprehensive panel Followup in 6 months    Multiple sclerosis ([facility] [address] )  Continue meds per neurology Reveiwed last notes and last MRI with patient Patient also reminded to see the eye doctor also    Tobacco abuse  Encouraged continuation of smoking cessation efforts     Vitamin D deficiency  Continue supplement and check labs    Facet arthritis of lumbar region  followup and management by spine center at [facility]    Right lumbar radiculopathy  followup and pain management byt [facility]    Right sided weakness  Unchanged followup with neurology    Localized osteoarthritis of right ankle  followup with orthopedics post CT scan to determine surgical plan    Overweight  Watch diet and continue to stay active       Diagnoses and all orders for this visit:    Hypertension, benign essential, goal below 140/90  -     amLODIPine (NORVASC) 10 mg tablet; Take 1 tablet (10 mg total) by mouth daily  -     losartan (COZAAR) 25 mg tablet; Take 1 tablet (25 mg total) by mouth daily  -     Lipid panel; Future  -     Comprehensive metabolic panel; Future    Current moderate episode of major depressive disorder without prior episode (HCC)    Multiple sclerosis ([facility] [address] )    Right sided weakness    Right lumbar radiculopathy    Facet arthritis of lumbar region    Vitamin D deficiency  -     Vitamin D 25 hydroxy;  Future    Tobacco abuse    Localized osteoarthritis of right ankle    Gastroesophageal reflux disease without esophagitis    Overweight    Other orders  -     cetirizine (ZyrTEC) 10 mg tablet; TAKE 1 TABLET BY MOUTH ONCE DAILY FOR ITCHING  - benzoyl peroxide 5 % external wash; CLEANSE ACNE AREAS TOPICALLY TWICE DAILY  -     clindamycin (CLEOCIN T) 1 % lotion; APPLY A THIN LAYER TOPICALLY TO ACNE AREAS TWICE DAILY  -     clobetasol (TEMOVATE) 0 05 % ointment; ON WEEK 1 AND 2 APPLY THIN LAYER TO RASH AREAS TWICE DAILY  (AVOID FACE SKIN FOLDS GENITALS)  -     doxycycline (ADOXA) 100 MG tablet; TAKE 1 TABLET BY MOUTH TWICE DAILY DO NOT LIE DOWN FOR 1 HOUR AFTER TAKING  -     hydrOXYzine HCL (ATARAX) 10 mg tablet; TAKE 1 TABLET BY MOUTH NIGHTLY FOR ITCHING  -     methocarbamol (ROBAXIN) 750 mg tablet; TAKE 1 TABLET BY MOUTH 4 TIMES DAILY AS NEEDED FOR MUSCLE SPASM  -     naproxen (NAPROSYN) 500 mg tablet; Take 500 mg by mouth 2 (two) times a day with meals  -     ondansetron (ZOFRAN-ODT) 4 mg disintegrating tablet; Take 4 mg by mouth every 8 (eight) hours as needed  -     triamcinolone (KENALOG) 0 1 % cream; APPLY A THIN LAYER TO RASH AREAS ON RIGHT ARM TWICE DAILY FOR 2 WEEKS THEN EVERY 3 DAYS AS NEEDED FOR FLARE UPS          Subjective:   Chief Complaint   Patient presents with    GERD    Hypertension     refill chantix          Patient ID: Debora Puckett is a 38 y o  male      Patient is here for follow up of hypertension GERD vitamin D deficiency right lumbar radiculopathy multiple sclerosis with right sided weakness major depression right ankle arthritis  and tobacco abuse Patient unfortunately continues to suffer with right low back pain that radiates to the right groin and leg Patient has injection done yesterday Patient has follow up with [facility] spine team on 7/20/2021 with physiatry Patient multiple sclerosis is being monitored by neurology They increased his medication They have follwoup in 11/2021 Patient is still with same right sided weakness that he has been having He does note some reflux issues and nausea with it Patient continues protonix with help and occasionally he uses zofran He stopped his fiber pill and now loose stools so he is restarting that Patient ntoes his depression seems stable  Patient is due for vitamin D level and lipid panel in 9/2021 Patient BP is controlled on meds Patient is having CT scan right ankle to plan for possible surgery for that Patient is taking pain meds for back through pain management team as part of the [facility] spine group that he follows with He is now at 1/2 ppd with the smoking       The following portions of the patient's history were reviewed and updated as appropriate: allergies, current medications, past family history, past medical history, past social history, past surgical history and problem list     Review of Systems   Constitutional: Positive for fatigue  Negative for activity change and appetite change  HENT: Negative for congestion, sinus pressure, sinus pain and trouble swallowing  Eyes: Negative for pain, discharge, redness and itching  Respiratory: Negative for cough, chest tightness and shortness of breath  Cardiovascular: Negative for chest pain, palpitations and leg swelling  Gastrointestinal: Negative for abdominal pain, constipation, diarrhea, nausea and vomiting  Genitourinary: Negative for difficulty urinating and dysuria  Musculoskeletal: Positive for arthralgias, back pain and myalgias  Skin: Positive for rash  Seeing dermatology for acne and rash on his arms   Neurological: Negative for dizziness, seizures, light-headedness, numbness and headaches  Psychiatric/Behavioral: Positive for decreased concentration  Negative for behavioral problems, dysphoric mood and sleep disturbance  The patient is not nervous/anxious  Still having focus isseus associated with his MS          Objective:      /80   Temp 98 °F (36 7 °C)   Ht 5' 8" (1 727 m)   Wt 81 kg (178 lb 9 6 oz)   BMI 27 16 kg/m²          Physical Exam  Vitals and nursing note reviewed  Constitutional:       Appearance: Normal appearance  HENT:      Head: Normocephalic and atraumatic  Right Ear: Tympanic membrane and external ear normal       Left Ear: Tympanic membrane and external ear normal    Cardiovascular:      Rate and Rhythm: Normal rate and regular rhythm  Heart sounds: Normal heart sounds  Pulmonary:      Effort: Pulmonary effort is normal       Breath sounds: Normal breath sounds  Abdominal:      General: Abdomen is flat  Bowel sounds are normal       Palpations: Abdomen is soft  Musculoskeletal:      Cervical back: Neck supple  Neurological:      Mental Status: He is alert and oriented to person, place, and time  Mental status is at baseline  Psychiatric:         Mood and Affect: Mood normal          Behavior: Behavior normal          Thought Content: Thought content normal          Judgment: Judgment normal        BMI Counseling: Body mass index is 27 16 kg/m²  The BMI is above normal  Nutrition recommendations include reducing portion sizes, decreasing overall calorie intake, 3-5 servings of fruits/vegetables daily and decreasing soda and/or juice intake

## 2021-07-08 NOTE — ASSESSMENT & PLAN NOTE
Continue meds per neurology Reveiwed last notes and last MRI with patient Patient also reminded to see the eye doctor also

## 2021-07-13 ENCOUNTER — HOSPITAL ENCOUNTER (OUTPATIENT)
Dept: CT IMAGING | Facility: HOSPITAL | Age: 41
Discharge: HOME | End: 2021-07-13
Attending: ORTHOPAEDIC SURGERY
Payer: COMMERCIAL

## 2021-07-13 DIAGNOSIS — M25.571 PAIN, JOINT, ANKLE AND FOOT, RIGHT: ICD-10-CM

## 2021-07-13 DIAGNOSIS — M19.071 LOCALIZED OSTEOARTHRITIS OF ANKLE, RIGHT: ICD-10-CM

## 2021-07-13 PROCEDURE — 73700 CT LOWER EXTREMITY W/O DYE: CPT

## 2021-07-16 ENCOUNTER — OFFICE VISIT (OUTPATIENT)
Dept: OBGYN CLINIC | Facility: CLINIC | Age: 41
End: 2021-07-16
Payer: COMMERCIAL

## 2021-07-16 VITALS
WEIGHT: 176.8 LBS | HEART RATE: 89 BPM | RESPIRATION RATE: 17 BRPM | SYSTOLIC BLOOD PRESSURE: 132 MMHG | HEIGHT: 68 IN | DIASTOLIC BLOOD PRESSURE: 88 MMHG | BODY MASS INDEX: 26.8 KG/M2

## 2021-07-16 DIAGNOSIS — M25.571 PAIN, JOINT, ANKLE AND FOOT, RIGHT: Primary | ICD-10-CM

## 2021-07-16 DIAGNOSIS — M19.071 LOCALIZED OSTEOARTHRITIS OF ANKLE, RIGHT: ICD-10-CM

## 2021-07-16 PROCEDURE — 3079F DIAST BP 80-89 MM HG: CPT | Performed by: ORTHOPAEDIC SURGERY

## 2021-07-16 PROCEDURE — 3075F SYST BP GE 130 - 139MM HG: CPT | Performed by: ORTHOPAEDIC SURGERY

## 2021-07-16 PROCEDURE — 99214 OFFICE O/P EST MOD 30 MIN: CPT | Performed by: ORTHOPAEDIC SURGERY

## 2021-07-16 PROCEDURE — 4004F PT TOBACCO SCREEN RCVD TLK: CPT | Performed by: ORTHOPAEDIC SURGERY

## 2021-07-16 PROCEDURE — 3008F BODY MASS INDEX DOCD: CPT | Performed by: ORTHOPAEDIC SURGERY

## 2021-07-16 NOTE — PATIENT INSTRUCTIONS
My plan would be to perform a corrective fusion through your subtalar joint and get your heel out of valgus  This would alleviate the pain in your lateral hindfoot  The problem with this correction, is that your forefoot would be plantarflexed and require us to correct at the same time  The options for forefoot correction include TN fusion (leading to a pantalar fusion), or a derotational osteotomy to preserve what little ROM you have left in your TN joint as it is  The ankle was fused in plantarflexion so the equinus that would occur after a subtalar fusion could potentially be solved with a dorsiflexion osteotomy of the ankle joint, but this could also lead to problems with bone healing      Dr Nhi Toledo at [facility] for second opinion

## 2021-07-16 NOTE — PROGRESS NOTES
LIZABETH Phillips  Attending, Orthopaedic Surgery  [facility] [address] [facility]      ORTHOPAEDIC FOOT AND ANKLE CLINIC VISIT     Assessment:     Encounter Diagnoses   Name Primary?  Pain, joint, ankle and foot, right Yes    Localized osteoarthritis of ankle, right             Plan:   · The patient verbalized understanding of exam findings and treatment plan  We engaged in the shared decision-making process and treatment options were discussed at length with the patient  Surgical and conservative management discussed today along with risks and benefits  · Discussed that my plan would be a corrective fusion through the subtalar joint to correct the valgus deformity to alleviate his lateral hindfoot pain but explained explicitly that this would not be without consequence as it would leave the foot plantarflexed and require us to perform a biplanar corrective osteotomy to correct the supination and equinus that resulted from adding the subtalar fusion  The other option is to correct this forefoot deformity through the CC and TN joint but this would leave him with no hindfoot motion  · His ankle fusion as a kid has his talus plantarflexed  · Recommended patient get a second opinion with Dr Virginie Eubanks at [facility] before proceeding with any elective reconstructive surgery  Return if symptoms worsen or fail to improve  History of Present Illness:   Chief Complaint:   Chief Complaint   Patient presents with    Right Ankle - Pain, Follow-up     Catherine Huertas is a 38 y o  male who is being seen in follow-up for Right ankle pain  When we last saw he we recommended a CT scan   Pain has not improved  Residual pain is localized at the lateral ankle with minimal radiating and described as sharp and severe        Pain/symptom timing:  Worse during the day when active  Pain/symptom context:  Worse with activites and work  Pain/symptom modifying factors:  Rest makes better, activities make worse  Pain/symptom associated signs/symptoms: none    Prior treatment   · NSAIDsYes   · Injections Yes   · Bracing/Orthotics Yes    · Physical Therapy Yes     Orthopedic Surgical History:   Right ankle fusion age 1    Past Medical, Surgical and Social History:  Past Medical History:  has a past medical history of Cervical stenosis of spine, Depression, GERD (gastroesophageal reflux disease), Hypertension, Latent tuberculosis infection, and Multiple sclerosis ([facility] [address] )  Problem List: does not have any pertinent problems on file  Past Surgical History:  has a past surgical history that includes Hemorroidectomy; Tympanic membrane repair; and Ankle osteotomy  Family History: family history includes Alcohol abuse in his sister; Alzheimer's disease in his maternal grandfather; Anemia in his sister; Hypertension in his mother; No Known Problems in his brother, father, maternal grandmother, paternal grandfather, and paternal grandmother; Pernicious anemia in his sister  Social History:  reports that he has been smoking  He has been smoking about 1 00 pack per day  He has never used smokeless tobacco  He reports current alcohol use  He reports that he does not use drugs  Current Medications: has a current medication list which includes the following prescription(s): amlodipine, benzoyl peroxide, calcium carbonate, calcium polycarbophil, cetirizine, cholecalciferol, clindamycin, clobetasol, cyanocobalamin, diclofenac sodium, dimethyl fumarate, doxycycline, escitalopram, ferrous sulfate, gabapentin, hydroxyzine hcl, lidocaine, losartan, methocarbamol, multiple vitamin, naproxen, ondansetron, oxycodone, pantoprazole, vitamin b-6, rifampin, sildenafil, and triamcinolone  Allergies: is allergic to lactose - food allergy and nicoderm [nicotine]       Review of Systems:  General- denies fever/chills  HEENT- denies hearing loss or sore throat  Eyes- denies eye pain or visual disturbances, denies red eyes  Respiratory- denies cough or SOB  Cardio- denies chest pain or palpitations  GI- denies abdominal pain  Endocrine- denies urinary frequency  Urinary- denies pain with urination  Musculoskeletal- Negative except noted above  Skin- denies rashes or wounds  Neurological- denies dizziness or headache  Psychiatric- denies anxiety or difficulty concentrating    Physical Exam:   /88 (BP Location: Right arm, Patient Position: Sitting)   Pulse 89   Resp 17   Ht 5' 8" (1 727 m)   Wt 80 2 kg (176 lb 12 8 oz)   BMI 26 88 kg/m²   General/Constitutional: No apparent distress: well-nourished and well developed  Eyes: normal ocular motion  Lymphatic: No appreciable lymphadenopathy  Respiratory: Non-labored breathing  Vascular: No edema, swelling or tenderness, except as noted in detailed exam   Integumentary: No impressive skin lesions present, except as noted in detailed exam   Neuro: No ataxia or tremors noted  Psych: Normal mood and affect, oriented to person, place and time  Appropriate affect  Musculoskeletal: Normal, except as noted in detailed exam and in HPI  Examination    Right    Gait                Antalgic/pes planus with cane   Musculoskeletal Tender to palpation at subtalar    Skin Normal       Nails Normal    Range of Motion  0 degrees dorsiflexion, 0 degrees plantarflexion  Subtalar motion: limited    Stability Stable    Muscle Strength 5/5 tibialis anterior  5/5 gastrocnemius-soleus  5/5 posterior tibialis  5/5 peroneal/eversion strength  5/5 EHL  5/5 FHL    Neurologic Normal    Sensation Intact to light touch throughout sural, saphenous, superficial peroneal, deep peroneal and medial/lateral plantar nerve distributions  [place] 5 07 filament (10g) testing  deferred      Cardiovascular Brisk capillary refill < 2 seconds,intact DP and PT pulses    Special Tests None      Imaging Studies:   CT available for review of Right lower extremity which shows tibiotalar and distal tibiofibular joint with osseous fusion and talonavicular and subtalar joint mild OA  Reviewed by me personally  Rutha Lineman Lachman, MD  Foot & Ankle Surgery   Department of [address] [place] [address]      I personally performed the service  Rutha Lineman Lachman, MD    Scribe Attestation    I,:  Nathaly Pressley MA am acting as a scribe while in the presence of the attending physician :       I,:  Luis Schmidt MD personally performed the services described in this documentation    as scribed in my presence :

## 2021-07-19 DIAGNOSIS — N52.1 ERECTILE DISORDER DUE TO MEDICAL CONDITION IN MALE: ICD-10-CM

## 2021-07-19 RX ORDER — SILDENAFIL 50 MG/1
50 TABLET, FILM COATED ORAL DAILY PRN
Qty: 10 TABLET | Refills: 2 | Status: SHIPPED | OUTPATIENT
Start: 2021-07-19 | End: 2021-08-26 | Stop reason: SDUPTHER

## 2021-07-27 ENCOUNTER — TELEPHONE (OUTPATIENT)
Dept: FAMILY MEDICINE CLINIC | Facility: CLINIC | Age: 41
End: 2021-07-27

## 2021-07-27 NOTE — TELEPHONE ENCOUNTER
I re read the consult with orthopedics He does not require another visit with me but he does need to get the CBC and BMP labs done that ortho needs prior to me signing his form SO have him do that and drop off the form

## 2021-08-24 DIAGNOSIS — I10 HYPERTENSION, BENIGN ESSENTIAL, GOAL BELOW 140/90: ICD-10-CM

## 2021-08-24 RX ORDER — AMLODIPINE BESYLATE 10 MG/1
10 TABLET ORAL DAILY
Qty: 90 TABLET | Refills: 0 | Status: SHIPPED | OUTPATIENT
Start: 2021-08-24 | End: 2021-10-18 | Stop reason: SDUPTHER

## 2021-10-18 DIAGNOSIS — N52.1 ERECTILE DISORDER DUE TO MEDICAL CONDITION IN MALE: ICD-10-CM

## 2021-10-18 DIAGNOSIS — F32.1 CURRENT MODERATE EPISODE OF MAJOR DEPRESSIVE DISORDER WITHOUT PRIOR EPISODE (HCC): ICD-10-CM

## 2021-10-18 DIAGNOSIS — I10 HYPERTENSION, BENIGN ESSENTIAL, GOAL BELOW 140/90: ICD-10-CM

## 2021-10-18 DIAGNOSIS — K21.9 GASTROESOPHAGEAL REFLUX DISEASE WITHOUT ESOPHAGITIS: ICD-10-CM

## 2021-10-18 RX ORDER — LOSARTAN POTASSIUM 25 MG/1
25 TABLET ORAL DAILY
Qty: 90 TABLET | Refills: 0 | Status: SHIPPED | OUTPATIENT
Start: 2021-10-18 | End: 2022-07-21 | Stop reason: SDUPTHER

## 2021-10-18 RX ORDER — AMLODIPINE BESYLATE 10 MG/1
10 TABLET ORAL DAILY
Qty: 90 TABLET | Refills: 0 | Status: SHIPPED | OUTPATIENT
Start: 2021-10-18

## 2021-10-18 RX ORDER — ESCITALOPRAM OXALATE 10 MG/1
10 TABLET ORAL DAILY
Qty: 90 TABLET | Refills: 0 | Status: SHIPPED | OUTPATIENT
Start: 2021-10-18 | End: 2022-05-31 | Stop reason: DRUGHIGH

## 2021-10-18 RX ORDER — PANTOPRAZOLE SODIUM 40 MG/1
40 TABLET, DELAYED RELEASE ORAL DAILY
Qty: 90 TABLET | Refills: 0 | Status: SHIPPED | OUTPATIENT
Start: 2021-10-18 | End: 2022-05-31

## 2021-10-18 RX ORDER — SILDENAFIL 50 MG/1
50 TABLET, FILM COATED ORAL DAILY PRN
Qty: 10 TABLET | Refills: 0 | Status: SHIPPED | OUTPATIENT
Start: 2021-10-18

## 2022-05-31 ENCOUNTER — OFFICE VISIT (OUTPATIENT)
Dept: FAMILY MEDICINE CLINIC | Facility: CLINIC | Age: 42
End: 2022-05-31
Payer: COMMERCIAL

## 2022-05-31 VITALS
TEMPERATURE: 97 F | BODY MASS INDEX: 26.88 KG/M2 | SYSTOLIC BLOOD PRESSURE: 140 MMHG | HEIGHT: 68 IN | DIASTOLIC BLOOD PRESSURE: 82 MMHG

## 2022-05-31 DIAGNOSIS — Z11.4 SCREENING FOR HIV (HUMAN IMMUNODEFICIENCY VIRUS): ICD-10-CM

## 2022-05-31 DIAGNOSIS — F32.1 CURRENT MODERATE EPISODE OF MAJOR DEPRESSIVE DISORDER WITHOUT PRIOR EPISODE (HCC): ICD-10-CM

## 2022-05-31 DIAGNOSIS — I10 HYPERTENSION, BENIGN ESSENTIAL, GOAL BELOW 140/90: Primary | ICD-10-CM

## 2022-05-31 DIAGNOSIS — G47.33 OBSTRUCTIVE SLEEP APNEA: ICD-10-CM

## 2022-05-31 DIAGNOSIS — F51.01 PRIMARY INSOMNIA: ICD-10-CM

## 2022-05-31 DIAGNOSIS — K21.9 GASTROESOPHAGEAL REFLUX DISEASE WITHOUT ESOPHAGITIS: ICD-10-CM

## 2022-05-31 DIAGNOSIS — Z11.59 ENCOUNTER FOR HEPATITIS C SCREENING TEST FOR LOW RISK PATIENT: ICD-10-CM

## 2022-05-31 DIAGNOSIS — E66.3 OVERWEIGHT: ICD-10-CM

## 2022-05-31 DIAGNOSIS — E55.9 VITAMIN D DEFICIENCY: ICD-10-CM

## 2022-05-31 PROBLEM — Z72.0 TOBACCO ABUSE: Status: RESOLVED | Noted: 2017-06-25 | Resolved: 2022-05-31

## 2022-05-31 PROCEDURE — 99214 OFFICE O/P EST MOD 30 MIN: CPT | Performed by: FAMILY MEDICINE

## 2022-05-31 RX ORDER — ESCITALOPRAM OXALATE 20 MG/1
20 TABLET ORAL DAILY
Qty: 30 TABLET | Refills: 3 | Status: SHIPPED | OUTPATIENT
Start: 2022-05-31 | End: 2022-07-21 | Stop reason: SDUPTHER

## 2022-05-31 RX ORDER — OXYCODONE HYDROCHLORIDE AND ACETAMINOPHEN 5; 325 MG/1; MG/1
2 TABLET ORAL EVERY 6 HOURS PRN
COMMUNITY
Start: 2022-05-31 | End: 2022-06-10

## 2022-05-31 RX ORDER — ZOLPIDEM TARTRATE 12.5 MG/1
12.5 TABLET, FILM COATED, EXTENDED RELEASE ORAL
COMMUNITY
End: 2022-06-02 | Stop reason: SDUPTHER

## 2022-05-31 RX ORDER — PSEUDOEPHED/ACETAMINOPH/DIPHEN 30MG-500MG
TABLET ORAL
COMMUNITY
Start: 2022-05-06

## 2022-05-31 NOTE — PATIENT INSTRUCTIONS
Chronic Hypertension   AMBULATORY CARE:   Hypertension  is high blood pressure  Your blood pressure is the force of your blood moving against the walls of your arteries  Hypertension causes your blood pressure to get so high that your heart has to work much harder than normal  This can damage your heart  Even if you have hypertension for years, lifestyle changes, medicines, or both can help bring your blood pressure to normal   Call your local emergency number (911 in the [address] [facility] [facility]) or have someone call if:   You have chest pain  You have any of the following signs of a heart attack:      Squeezing, pressure, or pain in your chest    You may  also have any of the following:     Discomfort or pain in your back, neck, jaw, stomach, or arm    Shortness of breath    Nausea or vomiting    Lightheadedness or a sudden cold sweat    You become confused or have difficulty speaking  You suddenly feel lightheaded or have trouble breathing  Seek care immediately if:   You have a severe headache or vision loss  You have weakness in an arm or leg  Call your doctor or cardiologist if:   You feel faint, dizzy, confused, or drowsy  You have been taking your blood pressure medicine but your pressure is higher than your provider says it should be  You have questions or concerns about your condition or care  Treatment for chronic hypertension  may include medicine to lower your blood pressure and cholesterol levels  A low cholesterol level helps prevent heart disease and makes it easier to control your blood pressure  Heart disease can make your blood pressure harder to control  You may also need to make lifestyle changes  What you need to know about the stages of hypertension:       Normal blood pressure is 119/79 or lower   Your healthcare provider may only check your blood pressure each year if it stays at a normal level  Elevated blood pressure is 120/79 to 129/79   This is sometimes called prehypertension  Your healthcare provider may suggest lifestyle changes to help lower your blood pressure to a normal level  He or she may then check it again in 3 to 6 months  Stage 1 hypertension is 130/80  to 139/89   Your provider may recommend lifestyle changes, medication, and checks every 3 to 6 months until your blood pressure is controlled  Stage 2 hypertension is 140/90 or higher   Your provider will recommend lifestyle changes and have you take 2 kinds of hypertension medicines  You will also need to have your blood pressure checked monthly until it is controlled  Manage chronic hypertension:   Check your blood pressure at home  Avoid smoking, caffeine, and exercise at least 30 minutes before checking your blood pressure  Sit and rest for 5 minutes before you take your blood pressure  Extend your arm and support it on a flat surface  Your arm should be at the same level as your heart  Follow the directions that came with your blood pressure monitor  Check your blood pressure 2 times, 1 minute apart, before you take your medicine in the morning  Also check your blood pressure before your evening meal  Keep a record of your readings and bring it to your follow-up visits  Ask your healthcare provider what your blood pressure should be  Manage any other health conditions you have  Health conditions such as diabetes can increase your risk for hypertension  Follow your healthcare provider's instructions and take all your medicines as directed  Talk to your healthcare provider about any new health conditions you have recently developed  Ask about all medicines  Certain medicines can increase your blood pressure  Examples include oral birth control pills, decongestants, herbal supplements, and NSAIDs, such as ibuprofen  Your healthcare provider can tell you which medicines are safe for you to take  This includes prescription and over-the-counter medicines      Lifestyle changes you can make to lower your blood pressure: Your provider may want you to make more lifestyle changes if you are having trouble controlling your blood pressure  This may feel difficult over time, especially if you think you are making good changes but your pressure is still high  It might help to focus on one new change at a time  For example, try to add 1 more day of exercise, or exercise for an extra 10 minutes on 2 days  Small changes can make a big difference  Your healthcare provider can also refer you to specialists such as a dietitian who can help you make small changes  Your family members may be included in helping you learn to create lifestyle changes, such as the following:     Limit sodium (salt) as directed  Too much sodium can affect your fluid balance  Check labels to find low-sodium or no-salt-added foods  Some low-sodium foods use potassium salts for flavor  Too much potassium can also cause health problems  Your healthcare provider will tell you how much sodium and potassium are safe for you to have in a day  He or she may recommend that you limit sodium to 2,300 mg a day  Follow the meal plan recommended by your healthcare provider  A dietitian or your provider can give you more information on low-sodium plans or the DASH (Dietary Approaches to Stop Hypertension) eating plan  The DASH plan is low in sodium, processed sugar, unhealthy fats, and total fat  It is high in potassium, calcium, and fiber  These can be found in vegetables, fruit, and whole-grain foods  Be physically active throughout the day  Physical activity, such as exercise, can help control your blood pressure and your weight  Be physically active for at least 30 minutes per day, on most days of the week  Include aerobic activity, such as walking or riding a bicycle  Also include strength training at least 2 times each week  Your healthcare providers can help you create a physical activity plan  Decrease stress    This may help lower your blood pressure  Learn ways to relax, such as deep breathing or listening to music  Limit alcohol as directed  Alcohol can increase your blood pressure  A drink of alcohol is 12 ounces of beer, 5 ounces of wine, or 1½ ounces of liquor  Do not smoke  Nicotine and other chemicals in cigarettes and cigars can increase your blood pressure and also cause lung damage  Ask your healthcare provider for information if you currently smoke and need help to quit  E-cigarettes or smokeless tobacco still contain nicotine  Talk to your healthcare provider before you use these products  Follow up with your doctor or cardiologist as directed: You will need to return to have your blood pressure checked and to have other lab tests done  Write down your questions so you remember to ask them during your visits  © Copyright [employer] 2022 Information is for End User's use only and may not be sold, redistributed or otherwise used for commercial purposes  All illustrations and images included in CareNotes® are the copyrighted property of A D A M , Inc  or [facility]   The above information is an  only  It is not intended as medical advice for individual conditions or treatments  Talk to your doctor, nurse or pharmacist before following any medical regimen to see if it is safe and effective for you

## 2022-05-31 NOTE — PROGRESS NOTES
Assessment/Plan:    Current moderate episode of major depressive disorder without prior episode ([facility])  Increased the lexapro to 20 mg again Patient referred to psychiatry    GERD (gastroesophageal reflux disease)  Stable continue meds    Hypertension, benign essential, goal below 140/90  Blood pressure is sta ble on meds continue meds    Vitamin D deficiency  Continue supplement check labs    Overweight  wegiht is stable continue with diet and exercise    Primary insomnia  Refilled the ambien until he can see sleep medicine     Obstructive sleep apnea  Refer to sleep medicine Patient to get me copy of study also       Diagnoses and all orders for this visit:    Hypertension, benign essential, goal below 140/90  -     Comprehensive metabolic panel; Future  -     Lipid panel; Future    Vitamin D deficiency  -     Vitamin D 25 hydroxy; Future    Overweight  -     Comprehensive metabolic panel; Future  -     Lipid panel; Future    Encounter for hepatitis C screening test for low risk patient  -     Hepatitis C antibody; Future    Screening for HIV (human immunodeficiency virus)  -     HIV 1/2 Antigen/Antibody (4th Generation) w Reflex [facility]; Future    Current moderate episode of major depressive disorder without prior episode [facility])  -     Ambulatory Referral to Psychiatry; Future  -     escitalopram (LEXAPRO) 20 mg tablet; Take 1 tablet (20 mg total) by mouth daily    Gastroesophageal reflux disease without esophagitis    Primary insomnia  -     Ambulatory Referral to Sleep Medicine; Future    Obstructive sleep apnea  -     Ambulatory Referral to Sleep Medicine; Future    Other orders  -     zolpidem (AMBIEN CR) 12 5 MG CR tablet; Take 12 5 mg by mouth daily at bedtime as needed for sleep  -     ocrelizumab 300 MG/10ML SOLN; Infuse into a venous catheter  -     Acetaminophen Extra Strength 500 MG tablet; TAKE 1 TABLET BY MOUTH EVERY 6 HOURS AS NEEDED FOR MILD PAIN (PAIN SCORE 1-3)    -     oxyCODONE-acetaminophen (PERCOCET) 5-325 mg per tablet; Take 2 tablets by mouth every 6 (six) hours as needed          Subjective:   Chief Complaint   Patient presents with    Follow-up    Hypertension          Patient ID: Sheila Lott is a 42 y o  male  Patient is here for followup of hypertension multiple sclerosis GERD chronic migrain sleep apnea depression insomnia and his weight Patient is struggling with the MS ans well as the migraines He gets abdominal pain and nausea with the migraines He is taking the lexapro but is still pretty depressed as he had to move in with brother and is filing for disability Neurology is managing the MS and migraines His reflux is stable on medication Patient has terrible insomnia for 2 yrs taking ambien nightly He had sleep study in 2/2022 positive for sleep apnea but due to move he needs a new sleep doctor and is asking for referral He is in a cast from his foot surgery and is seeing the orthopedic       The following portions of the patient's history were reviewed and updated as appropriate: allergies, current medications, past family history, past medical history, past social history, past surgical history and problem list     Review of Systems   Constitutional: Positive for fatigue  Negative for activity change, appetite change and fever  HENT: Negative for congestion, ear pain, postnasal drip, rhinorrhea, sinus pressure and sinus pain  Eyes: Negative for redness and visual disturbance  Respiratory: Negative for shortness of breath  Cardiovascular: Negative for chest pain, palpitations and leg swelling  Gastrointestinal: Positive for abdominal pain and nausea  Negative for vomiting  Abdominal pain and nause associated with the migraines   Musculoskeletal: Positive for back pain and neck pain  Chronic back and neck pain unchanged    Neurological: Positive for weakness and headaches  Negative for dizziness, syncope and light-headedness          Neurologic symptoms unchanged he is seeing neurology   Psychiatric/Behavioral: Positive for decreased concentration, dysphoric mood and sleep disturbance  The patient is not nervous/anxious  Objective:      /82   Temp (!) 97 °F (36 1 °C)   Ht 5' 8" (1 727 m)   BMI 26 88 kg/m²          Physical Exam  Vitals and nursing note reviewed  Constitutional:       Appearance: Normal appearance  He is well-developed  HENT:      Head: Normocephalic and atraumatic  Right Ear: Hearing, tympanic membrane and external ear normal       Left Ear: Hearing, tympanic membrane and external ear normal    Eyes:      Extraocular Movements: Extraocular movements intact  Conjunctiva/sclera: Conjunctivae normal       Pupils: Pupils are equal, round, and reactive to light  Neck:      Thyroid: No thyromegaly  Cardiovascular:      Rate and Rhythm: Normal rate and regular rhythm  Heart sounds: Normal heart sounds  Pulmonary:      Effort: Pulmonary effort is normal       Breath sounds: Normal breath sounds  No wheezing or rales  Abdominal:      General: Bowel sounds are normal  There is no distension  Palpations: Abdomen is soft  Tenderness: There is no abdominal tenderness  Musculoskeletal:      Cervical back: Neck supple  Comments: Cast right foot ankle   Lymphadenopathy:      Cervical: No cervical adenopathy  Skin:     General: Skin is warm and dry  Findings: No rash  Neurological:      Mental Status: He is alert and oriented to person, place, and time  Cranial Nerves: No cranial nerve deficit  Coordination: Coordination normal    Psychiatric:         Mood and Affect: Mood normal          Behavior: Behavior normal          Thought Content:  Thought content normal          Judgment: Judgment normal

## 2022-06-02 DIAGNOSIS — F51.01 PRIMARY INSOMNIA: ICD-10-CM

## 2022-06-02 DIAGNOSIS — G47.33 OBSTRUCTIVE SLEEP APNEA: Primary | ICD-10-CM

## 2022-06-02 RX ORDER — ZOLPIDEM TARTRATE 12.5 MG/1
12.5 TABLET, FILM COATED, EXTENDED RELEASE ORAL
Qty: 30 TABLET | Refills: 0 | Status: SHIPPED | OUTPATIENT
Start: 2022-06-02 | End: 2022-06-06 | Stop reason: CLARIF

## 2022-06-06 ENCOUNTER — TELEPHONE (OUTPATIENT)
Dept: FAMILY MEDICINE CLINIC | Facility: CLINIC | Age: 42
End: 2022-06-06

## 2022-06-06 DIAGNOSIS — F51.01 PRIMARY INSOMNIA: Primary | ICD-10-CM

## 2022-06-06 RX ORDER — ESZOPICLONE 2 MG/1
2 TABLET, FILM COATED ORAL
Qty: 30 TABLET | Refills: 0 | Status: SHIPPED | OUTPATIENT
Start: 2022-06-06 | End: 2023-05-04

## 2022-06-09 LAB
EXTERNAL HIV SCREEN: NORMAL
HCV AB SER-ACNC: NEGATIVE

## 2022-06-16 ENCOUNTER — TELEPHONE (OUTPATIENT)
Dept: PSYCHIATRY | Facility: CLINIC | Age: 42
End: 2022-06-16

## 2022-06-16 NOTE — TELEPHONE ENCOUNTER
spoke with patient in regards to referral  Patient requested to be put on the medication mgmt waitlist

## 2022-06-20 ENCOUNTER — TELEPHONE (OUTPATIENT)
Dept: SLEEP CENTER | Facility: CLINIC | Age: 42
End: 2022-06-20

## 2022-06-20 ENCOUNTER — OFFICE VISIT (OUTPATIENT)
Dept: SLEEP CENTER | Facility: CLINIC | Age: 42
End: 2022-06-20
Payer: COMMERCIAL

## 2022-06-20 VITALS
DIASTOLIC BLOOD PRESSURE: 87 MMHG | WEIGHT: 178 LBS | HEART RATE: 63 BPM | BODY MASS INDEX: 26.98 KG/M2 | HEIGHT: 68 IN | SYSTOLIC BLOOD PRESSURE: 155 MMHG

## 2022-06-20 DIAGNOSIS — G47.33 OBSTRUCTIVE SLEEP APNEA: Primary | ICD-10-CM

## 2022-06-20 DIAGNOSIS — G35 MULTIPLE SCLEROSIS (HCC): ICD-10-CM

## 2022-06-20 DIAGNOSIS — F51.01 PRIMARY INSOMNIA: ICD-10-CM

## 2022-06-20 DIAGNOSIS — I10 HYPERTENSION, BENIGN ESSENTIAL, GOAL BELOW 140/90: ICD-10-CM

## 2022-06-20 PROCEDURE — 99244 OFF/OP CNSLTJ NEW/EST MOD 40: CPT | Performed by: INTERNAL MEDICINE

## 2022-06-20 RX ORDER — DOXEPIN HYDROCHLORIDE 10 MG/1
10 CAPSULE ORAL
Qty: 30 CAPSULE | Refills: 0 | Status: SHIPPED | OUTPATIENT
Start: 2022-06-20 | End: 2022-07-18

## 2022-06-20 NOTE — PROGRESS NOTES
Review of Systems      Genitourinary need to urinate more than twice a night   Cardiology ankle/leg swelling   Gastrointestinal frequent heartburn/acid reflux   Neurology muscle weakness, numbness/tingling of an extremity, difficulty with memory and balance problems   Constitutional fatigue, excessive sweating at night and weight change   Integumentary none   Psychiatry anxiety and depression   Musculoskeletal muscle aches, back pain and legs twitching/jerking   Pulmonary snoring   ENT throat clearing and ringing in ears   Endocrine frequent urination   Hematological none

## 2022-06-20 NOTE — ASSESSMENT & PLAN NOTE
As detailed above with avoid benzodiazepines, Z drugs due to sedative hypnotic effect that could worsen underlying sleep disordered breathing, would rather try small dose doxepin 10 mg

## 2022-06-20 NOTE — PROGRESS NOTES
Sleep Consultation   Gertrude Colorado 42 y o  male MRN: [id number]      Reason for consultation: DONATO    Requesting physician: Dr Cathy Velazquez     Assessment/Plan  1  Obstructive sleep apnea  Assessment & Plan:  · Moderate obstructive sleep apnea diagnosed in [facility] with an AHI of 22 7 events per hour  · I discussed with him in length the importance of treating his sleep apnea specially with his underlying history of multiple sclerosis, sleep onset, maintenance insomnia, excessive daytime sleepiness  I would like to give him a trial of auto titrating CPAP with pressure range of 5-20 cm H2O I discussed with him in length the importance and the pathophysiology of underlying sleep apnea and the consequences of untreated sleep apnea is cardiovascular/cerebrovascular risk  · I would avoid sedative hypnotic medications to treat his primary insomnia as it could worsen his sleep apnea, I would rather start him on a trial of small dose doxepin 10 mg HS as needed for insomnia    Orders:  -     Ambulatory Referral to Sleep Medicine  -     CPAP Auto New DME    2  Primary insomnia  Assessment & Plan:  As detailed above with avoid benzodiazepines, Z drugs due to sedative hypnotic effect that could worsen underlying sleep disordered breathing, would rather try small dose doxepin 10 mg    Orders:  -     Ambulatory Referral to Sleep Medicine  -     doxepin (SINEquan) 10 mg capsule; Take 1 capsule (10 mg total) by mouth daily at bedtime    3  Hypertension, benign essential, goal below 140/90    4  Multiple sclerosis ([facility] [address] )          History of Present Illness   HPI:  Gertrude Colorado is a 42 y o  male with PMHx as below who comes in for evaluation of moderate obstructive sleep apnea    The patient was seen in an outside Pulmonary group had workup for obstructive sleep apnea underwent an in-lab diagnostic sleep study at [facility] found to have moderate obstructive sleep apnea, however he never had to discuss the results all treatment options  He is to establish his care  He is currently not working, he has history of multiple sclerosis  He goes to bed around 8:00 p m  And he stays at least 4 hours to fall asleep wakes up between 5-6 a m  He has multiple awakenings throughout the night use the bathroom and feels sleepy during the day and he would take 1-2 naps per week  He was told that he snores loudly, chokes and stops breathing while his sleep  He has frequent headache, chronic pain, heartburn, sleep talking, nightmares, acting out dreams, poor short-term memory, feelings of depression and anxiety  He drinks 12 oz of coffee per day, he used to smoke cigarettes and he quit in January 2022, he drinks 2 beers on the weekend and he uses occasional edible marijuana  His [place] Scale is 13/24            Review of Systems      Genitourinary need to urinate more than twice a night   Cardiology ankle/leg swelling   Gastrointestinal frequent heartburn/acid reflux   Neurology muscle weakness, numbness/tingling of an extremity, difficulty with memory and balance problems   Constitutional fatigue, excessive sweating at night and weight change   Integumentary none   Psychiatry anxiety and depression   Musculoskeletal muscle aches, back pain and legs twitching/jerking   Pulmonary snoring   ENT throat clearing and ringing in ears   Endocrine frequent urination   Hematological none             Historical Information   Past Medical History:   Diagnosis Date    Cervical stenosis of spine     Depression     GERD (gastroesophageal reflux disease)     Hypertension     Latent tuberculosis infection     Multiple sclerosis ([address] )      Past Surgical History:   Procedure Laterality Date    ANKLE OSTEOTOMY      HEMORROIDECTOMY      TYMPANIC MEMBRANE REPAIR       Family History   Problem Relation Age of Onset    Hypertension Mother     No Known Problems Father     Anemia Sister     Pernicious anemia Sister     No Known Problems Maternal Grandmother     Alzheimer's disease Maternal Grandfather     No Known Problems Paternal Grandmother     No Known Problems Paternal Grandfather     No Known Problems Brother     Alcohol abuse Sister      Social History     Socioeconomic History    Marital status: Single     Spouse name: Not on file    Number of children: Not on file    Years of education: Not on file    Highest education level: Not on file   Occupational History    Not on file   Tobacco Use    Smoking status: Former Smoker     Packs/day: 1 00    Smokeless tobacco: Never Used   Vaping Use    Vaping Use: Former   Substance and Sexual Activity    Alcohol use: Yes    Drug use: Never    Sexual activity: Yes     Partners: Female     Birth control/protection: Condom Male   Other Topics Concern    Not on file   Social History Narrative    Not on file     Social Determinants of Health     Financial Resource Strain: Not on file   Food Insecurity: Not on file   Transportation Needs: Not on file   Physical Activity: Not on file   Stress: Not on file   Social Connections: Not on file   Intimate Partner Violence: Not on file   Housing Stability: Not on file       Occupational History:  Not working currently    Meds/Allergies   Allergies   Allergen Reactions    Lactose - Food Allergy GI Intolerance    Gabapentin Rash       Home medications:  Prior to Admission medications    Medication Sig Start Date End Date Taking?  Authorizing Provider   Acetaminophen Extra Strength 500 MG tablet TAKE 1 TABLET BY MOUTH EVERY 6 HOURS AS NEEDED FOR MILD PAIN (PAIN SCORE 1-3)  5/6/22  Yes Historical Provider, MD   amLODIPine (NORVASC) 10 mg tablet Take 1 tablet (10 mg total) by mouth daily 10/18/21  Yes Loli Hess,    calcium carbonate (OS-ZHENG) 1250 (500 Ca) MG tablet Take 1 tablet by mouth daily 8/17/20  Yes Historical Provider, MD   calcium polycarbophil (FIBERCON) 625 mg tablet Take 625 mg by mouth   Yes Historical Provider, MD   cetirizine (ZyrTEC) 10 mg tablet TAKE 1 TABLET BY MOUTH ONCE DAILY FOR ITCHING 7/6/21  Yes Historical Provider, MD   Cholecalciferol 25 MCG (1000 UT) capsule Take 1,000 Units by mouth daily   Yes Historical Provider, MD   cyanocobalamin (VITAMIN B-12) 100 MCG tablet Take 100 mcg by mouth 8/17/20  Yes Historical Provider, MD   doxepin (SINEquan) 10 mg capsule Take 1 capsule (10 mg total) by mouth daily at bedtime 6/20/22  Yes Nicole Reeder MD   escitalopram (LEXAPRO) 20 mg tablet Take 1 tablet (20 mg total) by mouth daily 5/31/22  Yes Anna Blackwood DO   eszopiclone (LUNESTA) 2 mg tablet Take 1 tablet (2 mg total) by mouth daily at bedtime as needed for sleep Take immediately before bedtime 6/6/22  Yes Anna Blackwood,    ferrous sulfate 325 (65 Fe) mg tablet Take 324 mg by mouth 8/17/20  Yes Historical Provider, MD   losartan (COZAAR) 25 mg tablet Take 1 tablet (25 mg total) by mouth daily 10/18/21  Yes Anan Blackwood,    methocarbamol (ROBAXIN) 750 mg tablet TAKE 1 TABLET BY MOUTH 4 TIMES DAILY AS NEEDED FOR MUSCLE SPASM 6/11/21  Yes Historical Provider, MD   MULTIPLE VITAMIN PO Take 1 capsule by mouth    Yes Historical Provider, MD   ocrelizumab 300 MG/10ML SOLN Infuse into a venous catheter   Yes Historical Provider, MD   ondansetron (ZOFRAN-ODT) 4 mg disintegrating tablet Take 4 mg by mouth every 8 (eight) hours as needed 6/25/21  Yes Historical Provider, MD   Pyridoxine HCl (vitamin B-6) 25 MG tablet Take 25 mg by mouth daily 12/1/20  Yes Historical Provider, MD   sildenafil (VIAGRA) 50 MG tablet Take 1 tablet (50 mg total) by mouth daily as needed for erectile dysfunction 10/18/21  Yes Anna Blackwood, DO       Vitals:   Blood pressure 155/87, pulse 63, height 5' 8" (1 727 m), weight 80 7 kg (178 lb)  ,  Body mass index is 27 06 kg/m²    Neck Circumference: 14 75    Physical Exam  General:  Awake alert and oriented x 3, conversant without conversational dyspnea, NAD, normal affect  HEENT:   Sclera noninjected, nonicteric OU, Nares patent,  no craniofacial abnormalities, Mucous membranes, moist, no oral lesions, normal dentition, Mallampati class 4  NECK:  Trachea midline, no accessory muscle use, no stridor,  JVP not elevated  CARDIAC: Reg, single s1/S2, no m/r/g  PULM: CTA bilaterally no wheezing, rhonchi or rales  ABD: Soft nontender, nondistended, no rebound, no rigidity, no guarding  EXT: No cyanosis, no clubbing, no edema, normal capillary refill  NEURO: no focal neurologic deficits, AAOx3, moving all extremities appropriately    Labs: I have personally reviewed pertinent lab results  , ABG: No results found for: PHART, UBB8ABE, PO2ART, STO2ONO, A3ANIEBB, BEART, SOURCE, BNP: No results found for: BNP, CBC: No results found for: WBC, HGB, HCT, MCV, PLT, ADJUSTEDWBC, MCH, MCHC, RDW, MPV, NRBC, CMP: No results found for: SODIUM, K, CL, CO2, ANIONGAP, BUN, CREATININE, GLUCOSE, CALCIUM, AST, ALT, ALKPHOS, PROT, BILITOT, EGFR, PT/INR: No results found for: PT, INR, Troponin: No results found for: TROPONINI  No results found for: WBC, HGB, HCT, MCV, PLT   No results found for: GLUCOSE, CALCIUM, NA, K, CO2, CL, BUN, CREATININE  No results found for: IRON, TIBC, FERRITIN  No results found for: [id number]  No results found for: FOLATE      Sleep studies:  Diagnostic 2022: AHI 22 7 events per hour consistent with moderate obstructive sleep apnea          Cb Jacob MD  [facility]       Portions of the record may have been created with voice recognition software  Occasional wrong word or "sound a like" substitutions may have occurred due to the inherent limitations of voice recognition software  Read the chart carefully and recognize, using context, where substitutions have occurred

## 2022-06-20 NOTE — PATIENT INSTRUCTIONS
Sleep Apnea   AMBULATORY CARE:   Sleep apnea  is a condition that causes you to stop breathing often during sleep  Types of sleep apnea:   Obstructive sleep apnea (DONATO)  is the most common kind  The muscles and tissues around your throat relax and block air from passing through  Obesity, use of alcohol or cigarettes, or a family history are common causes  DONATO may increase your risk for complications after surgery  Central sleep apnea (CSA)  means your brain does not send signals to the muscles that control breathing  You do not take a breath even though your airway is open  Common causes include medical conditions such as heart failure, being older than 40, or use of opioids  Complex (or mixed) sleep apnea  means you have both obstructive and central sleep apnea  Common signs and symptoms:   Loud snoring or long pauses in breathing    Feeling sleepy, slow, and tired during the day    Snorting, gasping, or choking while you sleep, and waking up suddenly because of these    Feeling irritable during the day    Dry mouth or a headache in the mornings    Heavy night sweating    A hard time thinking, remembering things, or focusing on your tasks the following day    Call your local emergency number (911 in the [address] [facility]) if:   You have chest pain or trouble breathing  Call your doctor if:   You have new or worsening signs or symptoms  You have questions or concerns about your condition or care  Treatment  depends on the kind of apnea you have  A mouth device  may be needed if you have mild sleep apnea  These are designed to keep your throat open  Ask your dentist or healthcare provider about the best mouth device for you  A machine  may be used to help you get more air during sleep  A mask may be placed over your nose and mouth, or just your nose  The mask is hooked to the machine  You will get air through the mask      A continuous positive airway pressure (CPAP) machine  is used to keep your airway open during sleep  The machine blows a gentle stream of air into the mask when you breathe  This helps keep your airway open so you can breathe more regularly  Extra oxygen may be given through the machine  A bilevel positive airway pressure (BiPAP) machine  gives air but lowers the pressure when you breathe out  An adaptive servo-ventilator (ASV)  is a machine that learns your usual breathing pattern  Then, it uses pressure to give you air and prevent stops in your breathing  Surgery  to expand your airway or remove extra tissues may be needed  Surgery is usually only considered if other treatments do not work  Manage or prevent sleep apnea:   Reach and maintain a healthy weight  Ask your healthcare provider what a healthy weight is for you  Ask him or her to help you create a safe weight loss plan if you are overweight  Even a small goal of a 10% weight loss can improve your symptoms  Do not smoke  Nicotine and other chemicals in cigarettes and cigars can cause lung damage  Ask your healthcare provider for information if you currently smoke and need help to quit  E-cigarettes or smokeless tobacco still contain nicotine  Talk to your healthcare provider before you use these products  Do not drink alcohol or take sedative medicine before you go to sleep  Alcohol and sedatives can relax the muscles and tissues around your throat  This can block the airflow to your lungs  Sleep on your side or use pillows designed to prevent sleep apnea  This prevents your tongue or other tissues from blocking your throat  You can also raise the head of your bed  Follow up with your doctor or specialist as directed: You may need to have blood tests during your follow-up visits  Work with your provider to find the right breathing support equipment and settings for you  Write down your questions so you remember to ask them during your visits    © Copyright [employer] 2022 Information is for End User's use only and may not be sold, redistributed or otherwise used for commercial purposes  All illustrations and images included in CareNotes® are the copyrighted property of A D A M , Inc  or Briana Ibarra  The above information is an  only  It is not intended as medical advice for individual conditions or treatments  Talk to your doctor, nurse or pharmacist before following any medical regimen to see if it is safe and effective for you  Doxepin (By mouth)   Doxepin (DOX-e-pin)  Treats depression, anxiety, and sleep disorders  This medicine is a TCA  Brand Name(s): Silenor   There may be other brand names for this medicine  When This Medicine Should Not Be Used: This medicine is not right for everyone  Do not use it if you had an allergic reaction to doxepin or similar medicines or if you have glaucoma or problems urinating  How to Use This Medicine:   Capsule, Liquid, Tablet  Your doctor will tell you how much medicine to use  Do not use more than directed  Sinequan® oral liquid:   Measure the dose with the dropper that comes with the medicine  Mix the medicine with 120 mL (4 ounces) of water, milk, or fruit juice (orange, grapefruit, tomato, prune, or pineapple) before you drink it  Do not use grape juice or carbonated beverages (soda pop)  Mix the medicine just before you take your dose  Do not prepare it ahead of time  Silenor® tablet:   Do not take the tablet within 3 hours of a meal  It may not work as well, or it might make you sleepy the next day  Take the tablet 30 minutes before you go to bed  Do not take the medicine unless you can get a full night's sleep (7 to 8 hours)  This medicine should come with a Medication Guide  Ask your pharmacist for a copy if you do not have one  Missed dose: Take a dose as soon as you remember  If it is almost time for your next dose, wait until then and take a regular dose   Do not take extra medicine to make up for a missed dose   Store the medicine in a closed container at room temperature, away from heat, moisture, and direct light  Drugs and Foods to Avoid:   Ask your doctor or pharmacist before using any other medicine, including over-the-counter medicines, vitamins, and herbal products  Do not use this medicine if you have used an MAO inhibitor within the past 14 days  Some foods and medicines can affect how doxepin works  Tell your doctor if you are using any of the following:  Cimetidine, tolazamide  Other medicines for depression (including citalopram, escitalopram, fluoxetine, paroxetine, sertraline)  Medicine for heart rhythm problems (including flecainide, propafenone, quinidine)  Phenothiazine medicine (including chlorpromazine, perphenazine, promethazine, prochlorperazine, thioridazine)  Do not drink alcohol while you are using this medicine  This medicine can intensify the effects of other medicine that makes you sleepy, including allergy medicine and narcotic pain medicine  Warnings While Using This Medicine:   Tell your doctor if you are pregnant or planning to become pregnant during treatment with this medicine  This medicine may cause symptoms of sedation (including breathing problems, sluggishness, low muscle tone, feeding problems, or withdrawal symptoms) in the baby during the third trimester  Do not breastfeed during treatment with this medicine  Tell your doctor if you have liver disease, sleep apnea, or a history of mental illness  For some children, teenagers, and young adults, this medicine may increase mental or emotional problems  This may lead to thoughts of suicide and violence  Talk with your doctor right away if you have any thoughts or behavior changes that concern you  Tell your doctor if you or anyone in your family has a history of bipolar disorder or suicide attempts  Do not stop using this medicine suddenly   Your doctor will need to slowly decrease your dose before you stop it completely  Silenor® tablets may cause you to do things while you are still asleep that you may not remember the next morning, including driving a car, having sex, or eating food  Tell your doctor right away if you learn that this has happened  This medicine may make you drowsy  Do not drive or do anything that could be dangerous until you know how this medicine affects you  This medicine could cause infertility  Talk with your doctor before using this medicine if you plan to have children  Your doctor will check your progress and the effects of this medicine at regular visits  Keep all appointments  Silenor® tablets: Call your doctor if you still have trouble sleeping after you take this medicine for 7 to 10 days  Keep all medicine out of the reach of children  Never share your medicine with anyone  Possible Side Effects While Using This Medicine:   Call your doctor right away if you notice any of these side effects: Allergic reaction: Itching or hives, swelling in your face or hands, swelling or tingling in your mouth or throat, chest tightness, trouble breathing  Changes in behavior, or thoughts of hurting yourself or others  Eye pain, vision changes, seeing halos around lights  Fast, pounding, or uneven heartbeat  Feeling nervous, restless, anxious, agitated, or excited for no reason  Seizures or tremors  Severe confusion, or seeing or hearing things that are not there  Unusual bleeding or bruising  If you notice these less serious side effects, talk with your doctor:   Constipation  Dizziness or drowsiness  Dry mouth  If you notice other side effects that you think are caused by this medicine, tell your doctor  Call your doctor for medical advice about side effects  You may report side effects to FDA at 5-265-FDA-3642    © Copyright [employer] 2022 Information is for End User's use only and may not be sold, redistributed or otherwise used for commercial purposes    The above information is an  only  It is not intended as medical advice for individual conditions or treatments  Talk to your doctor, nurse or pharmacist before following any medical regimen to see if it is safe and effective for you

## 2022-06-20 NOTE — ASSESSMENT & PLAN NOTE
· Moderate obstructive sleep apnea diagnosed in [facility] with an AHI of 22 7 events per hour  · I discussed with him in length the importance of treating his sleep apnea specially with his underlying history of multiple sclerosis, sleep onset, maintenance insomnia, excessive daytime sleepiness    I would like to give him a trial of auto titrating CPAP with pressure range of 5-20 cm H2O I discussed with him in length the importance and the pathophysiology of underlying sleep apnea and the consequences of untreated sleep apnea is cardiovascular/cerebrovascular risk  · I would avoid sedative hypnotic medications to treat his primary insomnia as it could worsen his sleep apnea, I would rather start him on a trial of small dose doxepin 10 mg HS as needed for insomnia

## 2022-06-23 ENCOUNTER — TELEPHONE (OUTPATIENT)
Dept: SLEEP CENTER | Facility: CLINIC | Age: 42
End: 2022-06-23

## 2022-06-23 NOTE — TELEPHONE ENCOUNTER
Patient called stating that he doesn't need anxiety meds, he just needs his sleeping pills  He said he told Dr Jerry Oconnor this  He says he hasn't slept  I tried explaining what Dr Ayana Virgen note said about the sleeping meds making his apnea worse and he wasn't hearing me  I transferred him to the nurse line

## 2022-06-24 NOTE — TELEPHONE ENCOUNTER
Patient left voice message stating he is getting a CPAP machine on 7/5/22  When he saw Dr Gilbert Gipson on 6/20/22, he was ordered doxepin to help him sleep but it does not help at all  States he wants a sleeping medication like Ambien or Lunesta  Dr Gilbert Gipson, please advise

## 2022-06-27 NOTE — TELEPHONE ENCOUNTER
Unfortunately, we are not going to prescribe any sedative hypnotic medication/recommend against such as Ambien or Lunesta specially with an underlying untreated obstructive sleep apnea  Please notify the patient, he could seek a 2nd opinion if he would like

## 2022-07-17 DIAGNOSIS — F51.01 PRIMARY INSOMNIA: ICD-10-CM

## 2022-07-18 RX ORDER — DOXEPIN HYDROCHLORIDE 10 MG/1
10 CAPSULE ORAL
Qty: 30 CAPSULE | Refills: 0 | Status: SHIPPED | OUTPATIENT
Start: 2022-07-18 | End: 2022-08-17

## 2022-07-20 LAB
DME PARACHUTE DELIVERY DATE EXPECTED: NORMAL
DME PARACHUTE DELIVERY DATE REQUESTED: NORMAL
DME PARACHUTE DELIVERY NOTE: NORMAL
DME PARACHUTE ITEM DESCRIPTION: NORMAL
DME PARACHUTE ORDER STATUS: NORMAL
DME PARACHUTE SUPPLIER NAME: NORMAL
DME PARACHUTE SUPPLIER PHONE: NORMAL

## 2022-07-21 DIAGNOSIS — F51.01 PRIMARY INSOMNIA: ICD-10-CM

## 2022-07-21 RX ORDER — DOXEPIN HYDROCHLORIDE 10 MG/1
10 CAPSULE ORAL
Qty: 30 CAPSULE | Refills: 0 | Status: CANCELLED | OUTPATIENT
Start: 2022-07-21

## 2022-08-17 DIAGNOSIS — F51.01 PRIMARY INSOMNIA: ICD-10-CM

## 2022-08-17 RX ORDER — DOXEPIN HYDROCHLORIDE 10 MG/1
10 CAPSULE ORAL
Qty: 30 CAPSULE | Refills: 0 | Status: SHIPPED | OUTPATIENT
Start: 2022-08-17 | End: 2022-09-17

## 2022-08-24 ENCOUNTER — TELEPHONE (OUTPATIENT)
Dept: SLEEP CENTER | Facility: CLINIC | Age: 42
End: 2022-08-24

## 2022-08-24 LAB

## 2022-08-30 ENCOUNTER — TELEPHONE (OUTPATIENT)
Dept: SLEEP CENTER | Facility: CLINIC | Age: 42
End: 2022-08-30

## 2022-08-30 LAB

## 2022-08-30 NOTE — TELEPHONE ENCOUNTER
Izabela G3, set @ 5-20cm, heated humidifier, heated tubing, F30i wide FFM   Set up @ [facility], per script Dr Otoole Friendly

## 2022-09-17 DIAGNOSIS — F51.01 PRIMARY INSOMNIA: ICD-10-CM

## 2022-09-17 RX ORDER — DOXEPIN HYDROCHLORIDE 10 MG/1
10 CAPSULE ORAL
Qty: 30 CAPSULE | Refills: 0 | Status: SHIPPED | OUTPATIENT
Start: 2022-09-17 | End: 2022-10-21

## 2022-10-20 DIAGNOSIS — F51.01 PRIMARY INSOMNIA: ICD-10-CM

## 2022-10-21 RX ORDER — DOXEPIN HYDROCHLORIDE 10 MG/1
10 CAPSULE ORAL
Qty: 30 CAPSULE | Refills: 0 | Status: SHIPPED | OUTPATIENT
Start: 2022-10-21

## 2022-11-16 ENCOUNTER — PATIENT MESSAGE (OUTPATIENT)
Dept: SLEEP CENTER | Facility: CLINIC | Age: 42
End: 2022-11-16

## 2022-11-17 ENCOUNTER — TELEPHONE (OUTPATIENT)
Dept: SLEEP CENTER | Facility: CLINIC | Age: 42
End: 2022-11-17

## 2022-11-17 NOTE — TELEPHONE ENCOUNTER
----- Message from Mari Turner sent at 11/16/2022  4:29 PM EST -----  Regarding: sleep aid  Contact: [phone]  Is there any way that you would be able to send in a script for zolpidem? That was the prescripition that the doctor in [place] was prescribing  the pharmacy    I use is [facility] [address] in [place]   please and thank you

## 2022-11-17 NOTE — TELEPHONE ENCOUNTER
Unfortunately, I do not recommend using zolpidem as a long-term solution for insomnia, specially when there is noncompliance using the CPAP

## 2022-11-17 NOTE — TELEPHONE ENCOUNTER
Looking through [facility]  chart I saw he got set up on a CPAP machine on 8/30/2022  He no showed for his compliance appointment on  10/17/2022    Dr Odin Ray please advise on patient's medication request

## 2022-11-17 NOTE — TELEPHONE ENCOUNTER
Dr Brissa Garces reply regarding Ambien sent to patient via [address]  Also advised patient to call office to reschedule compliance follow up

## 2022-11-27 DIAGNOSIS — I10 HYPERTENSION, BENIGN ESSENTIAL, GOAL BELOW 140/90: ICD-10-CM

## 2022-11-28 RX ORDER — LOSARTAN POTASSIUM 25 MG/1
25 TABLET ORAL DAILY
Qty: 90 TABLET | Refills: 0 | Status: SHIPPED | OUTPATIENT
Start: 2022-11-28

## 2022-12-12 DIAGNOSIS — N52.1 ERECTILE DISORDER DUE TO MEDICAL CONDITION IN MALE: ICD-10-CM

## 2022-12-12 RX ORDER — SILDENAFIL 50 MG/1
50 TABLET, FILM COATED ORAL DAILY PRN
Qty: 10 TABLET | Refills: 0 | Status: SHIPPED | OUTPATIENT
Start: 2022-12-12

## 2022-12-12 RX ORDER — SILDENAFIL 50 MG/1
50 TABLET, FILM COATED ORAL DAILY PRN
Qty: 10 TABLET | Refills: 0 | OUTPATIENT
Start: 2022-12-12

## 2023-01-28 DIAGNOSIS — F32.1 CURRENT MODERATE EPISODE OF MAJOR DEPRESSIVE DISORDER WITHOUT PRIOR EPISODE (HCC): ICD-10-CM

## 2023-01-30 RX ORDER — ESCITALOPRAM OXALATE 20 MG/1
TABLET ORAL
Qty: 90 TABLET | Refills: 0 | Status: SHIPPED | OUTPATIENT
Start: 2023-01-30

## 2023-02-21 DIAGNOSIS — I10 HYPERTENSION, BENIGN ESSENTIAL, GOAL BELOW 140/90: ICD-10-CM

## 2023-02-21 RX ORDER — LOSARTAN POTASSIUM 25 MG/1
25 TABLET ORAL DAILY
Qty: 90 TABLET | Refills: 0 | Status: SHIPPED | OUTPATIENT
Start: 2023-02-21

## 2023-04-29 DIAGNOSIS — I10 HYPERTENSION, BENIGN ESSENTIAL, GOAL BELOW 140/90: ICD-10-CM

## 2023-04-29 DIAGNOSIS — N52.1 ERECTILE DISORDER DUE TO MEDICAL CONDITION IN MALE: ICD-10-CM

## 2023-04-29 DIAGNOSIS — F51.01 PRIMARY INSOMNIA: ICD-10-CM

## 2023-05-01 RX ORDER — DOXEPIN HYDROCHLORIDE 10 MG/1
10 CAPSULE ORAL
Qty: 30 CAPSULE | Refills: 0 | Status: SHIPPED | OUTPATIENT
Start: 2023-05-01

## 2023-05-01 RX ORDER — SILDENAFIL 50 MG/1
50 TABLET, FILM COATED ORAL DAILY PRN
Qty: 10 TABLET | Refills: 0 | Status: SHIPPED | OUTPATIENT
Start: 2023-05-01

## 2023-05-01 RX ORDER — LOSARTAN POTASSIUM 25 MG/1
25 TABLET ORAL DAILY
Qty: 90 TABLET | Refills: 0 | Status: SHIPPED | OUTPATIENT
Start: 2023-05-01

## 2023-05-01 NOTE — TELEPHONE ENCOUNTER
Last appointment was 6/20/2022  Patient missed his December appointment he was S/P Right BKA amputation     He rescheduled for your first available [facility] appointment will you refill until then?

## 2023-05-02 DIAGNOSIS — F51.01 PRIMARY INSOMNIA: ICD-10-CM

## 2023-05-02 DIAGNOSIS — N52.1 ERECTILE DISORDER DUE TO MEDICAL CONDITION IN MALE: ICD-10-CM

## 2023-05-02 DIAGNOSIS — I10 HYPERTENSION, BENIGN ESSENTIAL, GOAL BELOW 140/90: ICD-10-CM

## 2023-05-02 RX ORDER — DOXEPIN HYDROCHLORIDE 10 MG/1
10 CAPSULE ORAL
Qty: 30 CAPSULE | Refills: 0 | OUTPATIENT
Start: 2023-05-02

## 2023-05-02 RX ORDER — LOSARTAN POTASSIUM 25 MG/1
25 TABLET ORAL DAILY
Qty: 90 TABLET | Refills: 0 | OUTPATIENT
Start: 2023-05-02

## 2023-05-02 RX ORDER — SILDENAFIL 50 MG/1
50 TABLET, FILM COATED ORAL DAILY PRN
Qty: 10 TABLET | Refills: 0 | OUTPATIENT
Start: 2023-05-02

## 2023-05-04 ENCOUNTER — TELEMEDICINE (OUTPATIENT)
Dept: FAMILY MEDICINE CLINIC | Facility: CLINIC | Age: 43
End: 2023-05-04

## 2023-05-04 DIAGNOSIS — G35 MULTIPLE SCLEROSIS (HCC): ICD-10-CM

## 2023-05-04 DIAGNOSIS — F10.11 ALCOHOL ABUSE, IN REMISSION: ICD-10-CM

## 2023-05-04 DIAGNOSIS — F32.1 CURRENT MODERATE EPISODE OF MAJOR DEPRESSIVE DISORDER WITHOUT PRIOR EPISODE (HCC): ICD-10-CM

## 2023-05-04 DIAGNOSIS — I10 HYPERTENSION, BENIGN ESSENTIAL, GOAL BELOW 140/90: ICD-10-CM

## 2023-05-04 DIAGNOSIS — G47.33 OBSTRUCTIVE SLEEP APNEA: ICD-10-CM

## 2023-05-04 DIAGNOSIS — Z89.511 HISTORY OF BELOW-KNEE AMPUTATION OF RIGHT LOWER EXTREMITY (HCC): Primary | ICD-10-CM

## 2023-05-04 PROBLEM — Z89.519 HX OF BKA (HCC): Status: ACTIVE | Noted: 2023-01-05

## 2023-05-04 PROBLEM — R53.1 RIGHT SIDED WEAKNESS: Status: RESOLVED | Noted: 2021-06-09 | Resolved: 2023-05-04

## 2023-05-04 RX ORDER — NALTREXONE 380 MG
KIT INTRAMUSCULAR
COMMUNITY
Start: 2023-03-22

## 2023-05-04 RX ORDER — METHOCARBAMOL 500 MG/1
TABLET, FILM COATED ORAL
COMMUNITY
Start: 2023-01-30

## 2023-05-04 RX ORDER — MULTIVIT-MIN/IRON/FOLIC ACID/K 18-600-40
CAPSULE ORAL
COMMUNITY
Start: 2023-03-07

## 2023-05-04 RX ORDER — LOSARTAN POTASSIUM 50 MG/1
50 TABLET ORAL DAILY
COMMUNITY
Start: 2023-04-08

## 2023-05-04 RX ORDER — COLLAGENASE SANTYL 250 [ARB'U]/G
OINTMENT TOPICAL
COMMUNITY
Start: 2023-03-07

## 2023-05-04 RX ORDER — DOXEPIN HYDROCHLORIDE 50 MG/1
50 CAPSULE ORAL
COMMUNITY
Start: 2023-04-08

## 2023-05-04 RX ORDER — PREGABALIN 75 MG/1
CAPSULE ORAL
COMMUNITY
Start: 2023-03-08

## 2023-05-04 RX ORDER — PANTOPRAZOLE SODIUM 40 MG/1
TABLET, DELAYED RELEASE ORAL
COMMUNITY
Start: 2023-03-07

## 2023-05-04 RX ORDER — CALCIUM CARBONATE 500 MG/1
TABLET, CHEWABLE ORAL
COMMUNITY
Start: 2023-03-07

## 2023-05-04 NOTE — PROGRESS NOTES
Virtual Brief Visit    This Visit is being completed by telephone  The Patient is located at Home and in the following state in which I hold an active license [place]    The patient was identified by name and date of birth  Jo Baer was informed that this is a telemedicine visit and that the visit is being conducted through Telephone  My office door was closed  No one else was in the room  He acknowledged consent and understanding of privacy and security of the video platform  The patient has agreed to participate and understands they can discontinue the visit at any time  Patient is aware this is a billable service  It was my intent to perform this visit via video technology but the patient was not able to do a video connection so the visit was completed via audio telephone only  Assessment/Plan:    Problem List Items Addressed This Visit        Respiratory    Obstructive sleep apnea     Continue CPAP and follow up with sleep medicine            Cardiovascular and Mediastinum    Hypertension, benign essential, goal below 140/90     Patient to obtain a BP cuff and monitor blood pressure followup in 3 months her with me He will call if blood pressure is consistently > 140         Relevant Medications    losartan (COZAAR) 50 mg tablet       Nervous and Auditory    Multiple sclerosis (HCC)     Infusion and followup with neurology            Other    Current moderate episode of major depressive disorder without prior episode ([facility] [address] )     I have recommended close followup [facility] psychiatry         Relevant Medications    doxepin (SINEquan) 50 mg capsule    Hx of BKA (HCC) - Primary     followup with ortho and physiatry         Alcohol abuse, in remission     Congratulated on his sobriety He will continue vivitrol shots and attending AA meetings             Recent Visits  No visits were found meeting these conditions    Showing recent visits within past 7 days and meeting all other requirements  Today's Visits  Date Type Provider Dept   23 Telemedicine John Roldan DO [facility]   Showing today's visits and meeting all other requirements  Future Appointments  No visits were found meeting these conditions  Showing future appointments within next 150 days and meeting all other requirements         Visit Time  Total Visit Duration: 18  Name: Moises Zhang      : 1980      MRN: [id number]  Encounter Provider: John Roldan DO  Encounter Date: 2023   Encounter department: [facility] PRIMARY CARE    Assessment & Plan     1  History of below-knee amputation of right lower extremity [facility])  Assessment & Plan:  followup with ortho and physiatry      2  Current moderate episode of major depressive disorder without prior episode [facility])  Assessment & Plan:  I have recommended close followup [facility] psychiatry      3  Hypertension, benign essential, goal below 140/90  Assessment & Plan:  Patient to obtain a BP cuff and monitor blood pressure followup in 3 months her with me He will call if blood pressure is consistently > 140      4  Multiple sclerosis ([facility] [address] )  Assessment & Plan:  Infusion and followup with neurology      5  Obstructive sleep apnea  Assessment & Plan:  Continue CPAP and follow up with sleep medicine      6   Alcohol abuse, in remission  Assessment & Plan:  Congratulated on his sobriety He will continue vivitrol shots and attending AA meetings            Subjective      Patient attempted to get video visit set up and unfortunately was unable to do so Patient has not been in office since 2022 Patient had been treated for pain and osteomyelitis of right ankle Patient had to have a below knee amputation in 2022 Patient had been struggling a lot with that since then He was hospitalized with wound infection and also covid  He had underlying depression and anxiety however things got much worse Patient was consuming almost a case of beer daily Patient was isolating himself misusing medicataion and not caring from himself He presented to ER at [address] on 2/28/2023 due to this Patient was admitted medically cleared and then went to rehab Patient has been clean for 65 days now Patient is seeing neurology He is having his infusion for MS Patient has his dad for juan c and states he does not need any help in that regard His last recorded blood pressure was in 2/2023 and was fine Patient does not have a cuff at home but will purchase one Patet states that he is using CPAP Patient is having contact with psychiartry 2 times per week He is going to [place] [id number] meetings also Patient has increase in anixety and notes some palpitations     Review of Systems   Constitutional: Negative for fatigue, fever and unexpected weight change  HENT: Negative for congestion, sinus pain and trouble swallowing  Eyes: Negative for discharge and visual disturbance  Respiratory: Negative for cough, chest tightness, shortness of breath and wheezing  Cardiovascular: Positive for palpitations  Negative for chest pain and leg swelling  Gastrointestinal: Negative for abdominal pain, blood in stool, constipation, diarrhea, nausea and vomiting  Genitourinary: Negative for difficulty urinating, dysuria, frequency and hematuria  Musculoskeletal: Negative for arthralgias, gait problem and joint swelling  Having some phantom leg pain and also pain at the site of the  prostheisis   Skin: Negative for rash and wound  Allergic/Immunologic: Negative for environmental allergies and food allergies  Neurological: Negative for dizziness, syncope, weakness, numbness and headaches  Hematological: Negative for adenopathy  Does not bruise/bleed easily  Psychiatric/Behavioral: Negative for confusion, decreased concentration and sleep disturbance  The patient is nervous/anxious          Current Outpatient Medications on File Prior to Visit   Medication Sig    Acetaminophen Extra Strength 500 MG tablet TAKE 1 TABLET BY MOUTH EVERY 6 HOURS AS NEEDED FOR MILD PAIN (PAIN SCORE 1-3)   amLODIPine (NORVASC) 10 mg tablet Take 1 tablet (10 mg total) by mouth daily    Calcium Antacid 500 MG chewable tablet     calcium carbonate (OS-ZHENG) 1250 (500 Ca) MG tablet Take 1 tablet by mouth daily    cetirizine (ZyrTEC) 10 mg tablet TAKE 1 TABLET BY MOUTH ONCE DAILY FOR ITCHING    Cholecalciferol 25 MCG (1000 UT) capsule Take 1,000 Units by mouth daily    cyanocobalamin (VITAMIN B-12) 100 MCG tablet Take 100 mcg by mouth    D3-1000 25 MCG (1000 UT) tablet     doxepin (SINEquan) 10 mg capsule Take 1 capsule (10 mg total) by mouth daily at bedtime    doxepin (SINEquan) 50 mg capsule Take 50 mg by mouth daily at bedtime    escitalopram (LEXAPRO) 20 mg tablet TAKE 1 TABLET BY MOUTH EVERY DAY    losartan (COZAAR) 25 mg tablet Take 1 tablet (25 mg total) by mouth daily    losartan (COZAAR) 50 mg tablet Take 50 mg by mouth daily    methocarbamol (ROBAXIN) 500 mg tablet TAKE 1 TABLET BY MOUTH 4 TIMES A DAY AS NEEDED FOR MUSCLE SPASMS      MULTIPLE VITAMIN PO Take 1 capsule by mouth     ocrelizumab 300 MG/10ML SOLN Infuse into a venous catheter    pantoprazole (PROTONIX) 40 mg tablet     pregabalin (LYRICA) 75 mg capsule     Pyridoxine HCl (vitamin B-6) 25 MG tablet Take 25 mg by mouth daily    Santyl ointment     sildenafil (VIAGRA) 50 MG tablet Take 1 tablet (50 mg total) by mouth daily as needed for erectile dysfunction    Vivitrol 380 MG SUSR     [DISCONTINUED] calcium polycarbophil (FIBERCON) 625 mg tablet Take 625 mg by mouth    [DISCONTINUED] eszopiclone (LUNESTA) 2 mg tablet Take 1 tablet (2 mg total) by mouth daily at bedtime as needed for sleep Take immediately before bedtime    [DISCONTINUED] ferrous sulfate 325 (65 Fe) mg tablet Take 324 mg by mouth    [DISCONTINUED] methocarbamol (ROBAXIN) 750 mg tablet TAKE 1 TABLET BY MOUTH 4 TIMES DAILY AS NEEDED FOR MUSCLE SPASM    [DISCONTINUED] ondansetron (ZOFRAN-ODT) 4 mg disintegrating tablet Take 4 mg by mouth every 8 (eight) hours as needed       Objective     There were no vitals taken for this visit      Physical Exam  Jeremy Luke DO

## 2023-05-04 NOTE — ASSESSMENT & PLAN NOTE
Patient to obtain a BP cuff and monitor blood pressure followup in 3 months her with me He will call if blood pressure is consistently > 140

## 2023-05-04 NOTE — PATIENT INSTRUCTIONS
Chronic Hypertension   AMBULATORY CARE:   Hypertension is considered chronic  when it continues for 3 months or longer  Hypertension that continues causes your heart to work much harder than normal, which may lead to heart damage  Even if you have hypertension for years, lifestyle changes, medicines, or both may help lower your blood pressure  Call your local emergency number [phone] in the [address] [facility]) or have someone call if:   You have chest pain  You have any of the following signs of a heart attack:      Squeezing, pressure, or pain in your chest    You may  also have any of the following:     Discomfort or pain in your back, neck, jaw, stomach, or arm    Shortness of breath    Nausea or vomiting    Lightheadedness or a sudden cold sweat    You become confused or have difficulty speaking  You suddenly feel lightheaded or have trouble breathing  Seek care immediately if:   You have a severe headache or vision loss  You have weakness in an arm or leg  Call your doctor or cardiologist if:   You feel faint, dizzy, confused, or drowsy  You have been taking your blood pressure medicine but your pressure is higher than your provider says it should be  You have questions or concerns about your condition or care  Treatment for chronic hypertension  may include medicine to lower your blood pressure and cholesterol levels  A low cholesterol level helps prevent heart disease and makes it easier to control your blood pressure  Heart disease can make your blood pressure harder to control  You may also need to make lifestyle changes  What you need to know about the stages of hypertension:  Your healthcare provider will give you a blood pressure goal based on your age, health, and risk for cardiovascular disease  The following are general guidelines on the stages of hypertension:  Normal blood pressure is 119/79 or lower    Your provider may only check your blood pressure each year if it stays at a normal level     Elevated blood pressure is 120/79 to 129/79   This is sometimes called prehypertension  Your provider may suggest lifestyle changes to help lower your blood pressure to a normal level  He or she may then check it again in 3 to 6 months  Stage 1 hypertension is 130/80  to 139/89   Your provider may recommend lifestyle changes, medication, and checks every 3 to 6 months until your blood pressure is controlled  Stage 2 hypertension is 140/90 or higher   Your provider will recommend lifestyle changes and have you take 2 kinds of hypertension medicines  You will also need to have your blood pressure checked monthly until it is controlled  Manage chronic hypertension:   Check your blood pressure at home  Do not smoke, have caffeine, or exercise for at least 30 minutes before you check your blood pressure  Sit and rest for 5 minutes before you check your blood pressure  Extend your arm and support it on a flat surface  Your arm should be at the same level as your heart  Follow the directions that came with your blood pressure monitor  Check your blood pressure 2 times, 1 minute apart, before you take your medicine in the morning  Also check your blood pressure before your evening meal  Keep a record of your readings and bring it to your follow-up visits  Your healthcare provider may use the readings to make changes to your treatment plan  Manage any other health conditions you have  Health conditions such as diabetes can increase your risk for hypertension  Follow your provider's instructions and take all your medicines as directed  Talk to your provider about any new health conditions you have recently developed  Ask about all medicines  Certain medicines can increase your blood pressure  Examples include oral birth control pills, decongestants, herbal supplements, and NSAIDs, such as ibuprofen  Your provider can tell you which medicines are safe for you to take   This includes prescription and over-the-counter medicines  Lifestyle changes you can make to lower your blood pressure: Your provider may want you to make more lifestyle changes if you are having trouble controlling your blood pressure  This may feel difficult over time, especially if you think you are making good changes but your pressure is still high  It might help to focus on one new change at a time  For example, try to add 1 more day of exercise, or exercise for an extra 10 minutes on 2 days  Small changes can make a big difference  Your healthcare provider can also refer you to specialists such as a dietitian who can help you make small changes  Your family members may be included in helping you learn to create lifestyle changes, such as the following:     Limit sodium (salt) as directed  Too much sodium can affect your fluid balance  Check labels to find low-sodium or no-salt-added foods  Some low-sodium foods use potassium salts for flavor  Too much potassium can also cause health problems  Your provider will tell you how much sodium and potassium are safe for you to have in a day  He or she may recommend that you limit sodium to 2,300 mg a day  Follow the meal plan recommended by your provider  A dietitian or your provider can give you more information on low-sodium plans or the DASH (Dietary Approaches to Stop Hypertension) eating plan  The DASH plan is low in sodium, processed sugar, unhealthy fats, and total fat  It is high in potassium, calcium, and fiber  These can be found in vegetables, fruit, and whole-grain foods  Be physically active throughout the day  Physical activity, such as exercise, can help control your blood pressure and your weight  Be physically active for at least 30 minutes per day, on most days of the week  Include aerobic activity, such as walking or riding a bicycle  Also include strength training at least 2 times each week   Your provider can help you create a physical activity plan  Decrease stress  This may help lower your blood pressure  Learn ways to relax, such as deep breathing or listening to music  Limit alcohol as directed  Alcohol can increase your blood pressure  A drink of alcohol is 12 ounces of beer, 5 ounces of wine, or 1½ ounces of liquor  Your provider can help you set daily and weekly drink limits  He or she may recommend no alcohol if your blood pressure stays higher than goal even with medicine or other measures  Ask your provider for information if you need help to quit  Do not smoke  Nicotine and other chemicals in cigarettes and cigars can increase your blood pressure and also cause lung damage  Ask your provider for information if you currently smoke and need help to quit  E-cigarettes or smokeless tobacco still contain nicotine  Talk to your provider before you use these products  Follow up with your doctor or cardiologist as directed: You will need to return to have your blood pressure checked and to have other lab tests done  Write down your questions so you remember to ask them during your visits  © Copyright [employer] 2022 Information is for End User's use only and may not be sold, redistributed or otherwise used for commercial purposes  The above information is an  only  It is not intended as medical advice for individual conditions or treatments  Talk to your doctor, nurse or pharmacist before following any medical regimen to see if it is safe and effective for you

## 2024-08-06 NOTE — ASSESSMENT & PLAN NOTE
Refilled the ambien until he can see sleep medicine normal gait and station , no tenderness or deformities present

## 2025-05-21 ENCOUNTER — TELEPHONE (OUTPATIENT)
Dept: FAMILY MEDICINE CLINIC | Facility: CLINIC | Age: 45
End: 2025-05-21

## 2025-06-25 NOTE — TELEPHONE ENCOUNTER
06/24/25 9:03 PM     The office's request has been received and reviewed.    The PCP has been successfully removed with a patient attribution note.     This message will now be completed.    Thank you  Gissell Victoria